# Patient Record
Sex: FEMALE | Race: WHITE | Employment: FULL TIME | URBAN - METROPOLITAN AREA
[De-identification: names, ages, dates, MRNs, and addresses within clinical notes are randomized per-mention and may not be internally consistent; named-entity substitution may affect disease eponyms.]

---

## 2017-04-04 ENCOUNTER — HOSPITAL ENCOUNTER (OUTPATIENT)
Dept: MAMMOGRAPHY | Age: 37
Discharge: HOME | End: 2017-04-04
Attending: OBSTETRICS & GYNECOLOGY
Payer: COMMERCIAL

## 2017-04-04 DIAGNOSIS — Z80.3 FAMILY HISTORY OF BREAST CANCER IN FIRST DEGREE RELATIVE: ICD-10-CM

## 2017-04-04 PROCEDURE — 77067 SCR MAMMO BI INCL CAD: CPT

## 2017-04-04 PROCEDURE — 77063 BREAST TOMOSYNTHESIS BI: CPT

## 2017-04-17 ENCOUNTER — HOSPITAL ENCOUNTER (OUTPATIENT)
Dept: ULTRASOUND IMAGING | Age: 37
Discharge: HOME | End: 2017-04-17
Attending: OBSTETRICS & GYNECOLOGY
Payer: COMMERCIAL

## 2017-04-17 DIAGNOSIS — N60.01 BILATERAL BREAST CYSTS: ICD-10-CM

## 2017-04-17 DIAGNOSIS — N60.02 BILATERAL BREAST CYSTS: ICD-10-CM

## 2017-04-17 PROCEDURE — 76641 ULTRASOUND BREAST COMPLETE: CPT

## 2017-05-31 ENCOUNTER — OFFICE VISIT (OUTPATIENT)
Dept: FAMILY MEDICINE CLINIC | Facility: CLINIC | Age: 37
End: 2017-05-31

## 2017-05-31 VITALS
HEIGHT: 63 IN | WEIGHT: 122 LBS | HEART RATE: 86 BPM | TEMPERATURE: 98 F | SYSTOLIC BLOOD PRESSURE: 108 MMHG | RESPIRATION RATE: 16 BRPM | BODY MASS INDEX: 21.62 KG/M2 | DIASTOLIC BLOOD PRESSURE: 72 MMHG

## 2017-05-31 DIAGNOSIS — Z00.00 ROUTINE MEDICAL EXAM: Primary | ICD-10-CM

## 2017-05-31 PROCEDURE — 99395 PREV VISIT EST AGE 18-39: CPT | Performed by: PHYSICIAN ASSISTANT

## 2017-05-31 NOTE — PROGRESS NOTES
HPI:   Geetha Harley is a 39year old female who presents for a work physical exam. Symptoms: denies discharge, itching, burning or dysuria, periods are regular. LMP 3 weeks ago. Getting labs done through her work in July.  She does not have a form to throat  LUNGS: denies shortness of breath with exertion  CARDIOVASCULAR: denies chest pain on exertion  GI: denies abdominal pain,denies heartburn  : denies dysuria, vaginal discharge or itching, periods regular   MUSCULOSKELETAL: denies back pain  NEURO

## 2017-07-14 ENCOUNTER — TELEPHONE (OUTPATIENT)
Dept: FAMILY MEDICINE CLINIC | Facility: CLINIC | Age: 37
End: 2017-07-14

## 2017-07-28 ENCOUNTER — PATIENT MESSAGE (OUTPATIENT)
Dept: FAMILY MEDICINE CLINIC | Facility: CLINIC | Age: 37
End: 2017-07-28

## 2017-07-28 DIAGNOSIS — R79.0 ABNORMAL BLOOD LEVEL OF IRON: Primary | ICD-10-CM

## 2017-07-28 NOTE — TELEPHONE ENCOUNTER
----- Message from Julianne Chain sent at 7/28/2017 12:11 PM CDT -----  Regarding: Test Results Question  Contact: [phone]  I recently had blood work done through the wellness program provided by my employer.   My iron level was high, and I am won

## 2017-08-01 ENCOUNTER — APPOINTMENT (OUTPATIENT)
Dept: LAB | Age: 37
End: 2017-08-01
Attending: PHYSICIAN ASSISTANT
Payer: COMMERCIAL

## 2017-08-01 DIAGNOSIS — R79.0 ABNORMAL BLOOD LEVEL OF IRON: ICD-10-CM

## 2017-08-01 LAB
DEPRECATED HBV CORE AB SER IA-ACNC: 20.3 NG/ML (ref 10–291)
IRON SATURATION: 16 % (ref 13–45)
IRON: 55 UG/DL (ref 28–170)
TOTAL IRON BINDING CAPACITY: 344 UG/DL (ref 298–536)
TRANSFERRIN: 231 MG/DL (ref 200–360)

## 2017-08-01 PROCEDURE — 83550 IRON BINDING TEST: CPT

## 2017-08-01 PROCEDURE — 82728 ASSAY OF FERRITIN: CPT

## 2017-08-01 PROCEDURE — 83540 ASSAY OF IRON: CPT

## 2017-08-01 PROCEDURE — 36415 COLL VENOUS BLD VENIPUNCTURE: CPT

## 2017-09-21 ENCOUNTER — OFFICE VISIT (OUTPATIENT)
Dept: FAMILY MEDICINE CLINIC | Facility: CLINIC | Age: 37
End: 2017-09-21

## 2017-09-21 VITALS
TEMPERATURE: 98 F | HEART RATE: 77 BPM | RESPIRATION RATE: 16 BRPM | WEIGHT: 120 LBS | HEIGHT: 63 IN | BODY MASS INDEX: 21.26 KG/M2 | OXYGEN SATURATION: 98 %

## 2017-09-21 DIAGNOSIS — N30.00 ACUTE CYSTITIS WITHOUT HEMATURIA: Primary | ICD-10-CM

## 2017-09-21 DIAGNOSIS — R30.0 DYSURIA: ICD-10-CM

## 2017-09-21 LAB
MULTISTIX LOT#: ABNORMAL NUMERIC
PH, URINE: 7 (ref 4.5–8)
SPECIFIC GRAVITY: 1.02 (ref 1–1.03)
URINE-COLOR: YELLOW
UROBILINOGEN,SEMI-QN: 1 MG/DL (ref 0–1.9)

## 2017-09-21 PROCEDURE — 87086 URINE CULTURE/COLONY COUNT: CPT | Performed by: NURSE PRACTITIONER

## 2017-09-21 PROCEDURE — 87077 CULTURE AEROBIC IDENTIFY: CPT | Performed by: NURSE PRACTITIONER

## 2017-09-21 PROCEDURE — 81003 URINALYSIS AUTO W/O SCOPE: CPT | Performed by: NURSE PRACTITIONER

## 2017-09-21 PROCEDURE — 99213 OFFICE O/P EST LOW 20 MIN: CPT | Performed by: NURSE PRACTITIONER

## 2017-09-21 RX ORDER — NITROFURANTOIN 25; 75 MG/1; MG/1
100 CAPSULE ORAL 2 TIMES DAILY
Qty: 10 CAPSULE | Refills: 0 | Status: SHIPPED | OUTPATIENT
Start: 2017-09-21 | End: 2017-09-26

## 2017-09-21 NOTE — PROGRESS NOTES
Vicente Kimball is a 40year old female. HPI:   Patient presents with symptoms of UTI for 3 days. Complaining of urinary frequency, urgency. Denies back pain, fever, hematuria.   Pt has history of UTI in past.  Pt denies any new sexual partner in th LEUKOCYTES trace Negative   APPEARANCE cloudy Clear   URINE-COLOR yellow Yellow   Multistix Lot# [id number] Numeric   Multistix Expiration Date 4/30/18 Date       ASSESSMENT AND PLAN:   UTI    Educated on medication Macrobid 100mg po bid x 5 days  Educated on · Unable to hold urine in (urinary incontinence)  · Fever  · Loss of appetite  · Confusion (in older adults)  Causes  Bladder infections are not contagious. You can't get one from someone else, from a toilet seat, or from sharing a bath.   The most common c · Drink plenty of fluids to prevent dehydration and flush out your bladder. Do this unless you must restrict fluids for other health reasons, or your doctor told you not to. · Proper cleaning after going to the bathroom is important.  Wipe from front to ba © 8361-6722 The [address] [place] [address], [address] [place]. All rights reserved. This information is not intended as a substitute for professional medical care. Always follow your healthcare professional's instructions.           The p

## 2017-10-25 ENCOUNTER — OFFICE VISIT (OUTPATIENT)
Dept: FAMILY MEDICINE CLINIC | Facility: CLINIC | Age: 37
End: 2017-10-25

## 2017-10-25 VITALS
BODY MASS INDEX: 22.5 KG/M2 | RESPIRATION RATE: 16 BRPM | HEART RATE: 72 BPM | DIASTOLIC BLOOD PRESSURE: 78 MMHG | SYSTOLIC BLOOD PRESSURE: 106 MMHG | TEMPERATURE: 99 F | HEIGHT: 63 IN | WEIGHT: 127 LBS

## 2017-10-25 DIAGNOSIS — R09.82 POST-NASAL DRIP: Primary | ICD-10-CM

## 2017-10-25 DIAGNOSIS — J30.89 CHRONIC NONSEASONAL ALLERGIC RHINITIS DUE TO OTHER ALLERGEN: ICD-10-CM

## 2017-10-25 PROCEDURE — 99213 OFFICE O/P EST LOW 20 MIN: CPT | Performed by: PHYSICIAN ASSISTANT

## 2017-10-25 RX ORDER — FLUTICASONE PROPIONATE 50 MCG
1 SPRAY, SUSPENSION (ML) NASAL DAILY
Qty: 1 BOTTLE | Refills: 1 | Status: SHIPPED | OUTPATIENT
Start: 2017-10-25 | End: 2017-11-24

## 2017-10-25 NOTE — PROGRESS NOTES
HPI:   Gualberto Ring is a 40year old female who presents for post nasal drip for several months. Patient reports post nasal drip constantly. Clears her throat often. States when she swallows, she feels a lump in her throat. Denies nasal congestion.  D on proper use. May start claritin or allegra over the counter  Advised to continue both for at least 2 weeks.   If symptoms persist, consider ENT consult for further evaluation of the globus sensation.  - Fluticasone Propionate 50 MCG/ACT Nasal Suspension;

## 2017-12-05 ENCOUNTER — PATIENT MESSAGE (OUTPATIENT)
Dept: FAMILY MEDICINE CLINIC | Facility: CLINIC | Age: 37
End: 2017-12-05

## 2017-12-05 DIAGNOSIS — R20.0 HAND NUMBNESS: Primary | ICD-10-CM

## 2017-12-05 NOTE — TELEPHONE ENCOUNTER
From: Cali Alexandra  To: Shen Saucedo MD  Sent: 12/5/2017 1:25 PM CST  Subject: Referral Request    I had received a referral from you to see Dr. Corie Goel in 2015 in regards to pain/numbness in my hand.  He recommended I receive shots in my

## 2018-05-23 ENCOUNTER — OFFICE VISIT (OUTPATIENT)
Dept: FAMILY MEDICINE CLINIC | Facility: CLINIC | Age: 38
End: 2018-05-23

## 2018-05-23 VITALS
OXYGEN SATURATION: 97 % | SYSTOLIC BLOOD PRESSURE: 104 MMHG | TEMPERATURE: 98 F | RESPIRATION RATE: 18 BRPM | HEART RATE: 78 BPM | DIASTOLIC BLOOD PRESSURE: 60 MMHG

## 2018-05-23 DIAGNOSIS — R39.9 UTI SYMPTOMS: Primary | ICD-10-CM

## 2018-05-23 PROCEDURE — 87077 CULTURE AEROBIC IDENTIFY: CPT | Performed by: NURSE PRACTITIONER

## 2018-05-23 PROCEDURE — 99213 OFFICE O/P EST LOW 20 MIN: CPT | Performed by: NURSE PRACTITIONER

## 2018-05-23 PROCEDURE — 87086 URINE CULTURE/COLONY COUNT: CPT | Performed by: NURSE PRACTITIONER

## 2018-05-23 PROCEDURE — 81003 URINALYSIS AUTO W/O SCOPE: CPT | Performed by: NURSE PRACTITIONER

## 2018-05-23 RX ORDER — NITROFURANTOIN 25; 75 MG/1; MG/1
100 CAPSULE ORAL 2 TIMES DAILY
Qty: 14 CAPSULE | Refills: 0 | Status: SHIPPED | OUTPATIENT
Start: 2018-05-23 | End: 2018-05-30

## 2018-05-23 NOTE — PROGRESS NOTES
CHIEF COMPLAINT:   Patient presents with:  UTI: x3 days      HPI:   Cony Mir is a 40year old female who presents with symptoms of UTI. Complaining of urinary frequency, urgency for past 3 days.  Symptoms have been waxing and weaning since onset BILIRUBIN neg Negative   KETONES (URINE DIPSTICK) neg Negative mg/dL   SPECIFIC GRAVITY >1.030 1.005 - 1.030   OCCULT BLOOD trace Negative   PH, URINE 6 4.5 - 8.0   PROTEIN (URINE DIPSTICK) neg Negative/Trace mg/dL   UROBILINOGEN,SEMI-QN 0.2 0.0 - 1.9 mg/d The phrases \"bladder infection,\" \"UTI,\" and \"cystitis\" are often used to describe the same thing. But they are not always the same. Cystitis is an inflammation of the bladder. The most common cause of cystitis is an infection.   Symptoms  The infectio · Take antibiotics until they are used up, even if you feel better. It is important to finish them to make sure the infection has cleared. · You can use acetaminophen or ibuprofen for pain, fever, or discomfort, unless another medicine was prescribed.  If If X-rays were done, you will be told if the results will affect your treatment.   Call 911  Call 911 if any of the following occur:  · Trouble breathing  · Hard to wake up or confusion  · Fainting or loss of consciousness  · Rapid heart rate  When to seek

## 2018-06-20 ENCOUNTER — LAB ENCOUNTER (OUTPATIENT)
Dept: LAB | Age: 38
End: 2018-06-20
Attending: PHYSICIAN ASSISTANT
Payer: COMMERCIAL

## 2018-06-20 ENCOUNTER — OFFICE VISIT (OUTPATIENT)
Dept: FAMILY MEDICINE CLINIC | Facility: CLINIC | Age: 38
End: 2018-06-20

## 2018-06-20 VITALS
BODY MASS INDEX: 21.09 KG/M2 | RESPIRATION RATE: 18 BRPM | DIASTOLIC BLOOD PRESSURE: 64 MMHG | HEART RATE: 70 BPM | SYSTOLIC BLOOD PRESSURE: 108 MMHG | OXYGEN SATURATION: 98 % | WEIGHT: 119 LBS | TEMPERATURE: 98 F | HEIGHT: 63 IN

## 2018-06-20 DIAGNOSIS — L65.9 HAIR LOSS: ICD-10-CM

## 2018-06-20 DIAGNOSIS — R53.83 OTHER FATIGUE: ICD-10-CM

## 2018-06-20 DIAGNOSIS — L65.9 HAIR LOSS: Primary | ICD-10-CM

## 2018-06-20 PROCEDURE — 83550 IRON BINDING TEST: CPT

## 2018-06-20 PROCEDURE — 82306 VITAMIN D 25 HYDROXY: CPT

## 2018-06-20 PROCEDURE — 84402 ASSAY OF FREE TESTOSTERONE: CPT

## 2018-06-20 PROCEDURE — 36415 COLL VENOUS BLD VENIPUNCTURE: CPT

## 2018-06-20 PROCEDURE — 83540 ASSAY OF IRON: CPT

## 2018-06-20 PROCEDURE — 84443 ASSAY THYROID STIM HORMONE: CPT

## 2018-06-20 PROCEDURE — 84439 ASSAY OF FREE THYROXINE: CPT

## 2018-06-20 PROCEDURE — 99213 OFFICE O/P EST LOW 20 MIN: CPT | Performed by: PHYSICIAN ASSISTANT

## 2018-06-20 PROCEDURE — 82607 VITAMIN B-12: CPT

## 2018-06-20 PROCEDURE — 84403 ASSAY OF TOTAL TESTOSTERONE: CPT

## 2018-06-20 PROCEDURE — 82627 DEHYDROEPIANDROSTERONE: CPT

## 2018-06-20 PROCEDURE — 85025 COMPLETE CBC W/AUTO DIFF WBC: CPT

## 2018-06-20 PROCEDURE — 82728 ASSAY OF FERRITIN: CPT

## 2018-06-20 NOTE — PROGRESS NOTES
HPI:    Patient ID: Mckay Guardado is a 40year old female. HPI  Pt presents to clinic with hair loss. Noticing more hair in the drain after she showers and when she garcia her hair after she showers.  States she had similar symptoms a few years ago w moist.   No areas of baldness or obvious hair loss   Eyes: Conjunctivae are normal.   Neck: Neck supple. No thyromegaly present. Cardiovascular: Normal rate, regular rhythm, normal heart sounds and intact distal pulses.     Pulmonary/Chest: Effort normal

## 2018-10-12 PROBLEM — Z98.890 HISTORY OF HYSTEROSALPINGOGRAM: Status: ACTIVE | Noted: 2018-10-12

## 2018-10-16 PROBLEM — N80.9 ENDOMETRIOSIS: Status: ACTIVE | Noted: 2018-10-16

## 2018-10-25 ENCOUNTER — HOSPITAL ENCOUNTER (OUTPATIENT)
Dept: ULTRASOUND IMAGING | Age: 38
Discharge: HOME | End: 2018-10-25
Attending: OBSTETRICS & GYNECOLOGY
Payer: COMMERCIAL

## 2018-10-25 ENCOUNTER — HOSPITAL ENCOUNTER (OUTPATIENT)
Dept: MAMMOGRAPHY | Age: 38
Discharge: HOME | End: 2018-10-25
Attending: OBSTETRICS & GYNECOLOGY
Payer: COMMERCIAL

## 2018-10-25 DIAGNOSIS — N60.02 BILATERAL BREAST CYSTS: ICD-10-CM

## 2018-10-25 DIAGNOSIS — N63.20 BILATERAL BREAST LUMP: ICD-10-CM

## 2018-10-25 DIAGNOSIS — N63.10 BILATERAL BREAST LUMP: ICD-10-CM

## 2018-10-25 DIAGNOSIS — N60.01 BILATERAL BREAST CYSTS: ICD-10-CM

## 2018-10-25 PROCEDURE — 76641 ULTRASOUND BREAST COMPLETE: CPT | Performed by: OBSTETRICS & GYNECOLOGY

## 2018-10-25 PROCEDURE — 77062 BREAST TOMOSYNTHESIS BI: CPT | Performed by: OBSTETRICS & GYNECOLOGY

## 2018-10-25 PROCEDURE — 77066 DX MAMMO INCL CAD BI: CPT | Performed by: OBSTETRICS & GYNECOLOGY

## 2018-11-02 ENCOUNTER — HOSPITAL ENCOUNTER (OUTPATIENT)
Dept: MRI IMAGING | Age: 38
Discharge: HOME | End: 2018-11-02
Attending: OBSTETRICS & GYNECOLOGY
Payer: COMMERCIAL

## 2018-11-02 DIAGNOSIS — N63.0 LUMP OR MASS IN BREAST: ICD-10-CM

## 2018-11-02 PROCEDURE — A9575 INJ GADOTERATE MEGLUMI 0.1ML: HCPCS | Performed by: OBSTETRICS & GYNECOLOGY

## 2018-11-02 PROCEDURE — 0159T MRI BREAST (W+WO) W/CAD BILAT (CPT=77059/0159T): CPT | Performed by: OBSTETRICS & GYNECOLOGY

## 2018-11-02 PROCEDURE — 77059 MRI BREAST (W+WO) W/CAD BILAT (CPT=77059/0159T): CPT | Performed by: OBSTETRICS & GYNECOLOGY

## 2019-04-05 ENCOUNTER — HOSPITAL ENCOUNTER (OUTPATIENT)
Dept: ULTRASOUND IMAGING | Age: 39
Discharge: HOME | End: 2019-04-05
Attending: OBSTETRICS & GYNECOLOGY
Payer: COMMERCIAL

## 2019-04-05 DIAGNOSIS — N60.01 BILATERAL BREAST CYSTS: ICD-10-CM

## 2019-04-05 DIAGNOSIS — N63.20 BILATERAL BREAST LUMP: ICD-10-CM

## 2019-04-05 DIAGNOSIS — N63.10 BILATERAL BREAST LUMP: ICD-10-CM

## 2019-04-05 DIAGNOSIS — N60.02 BILATERAL BREAST CYSTS: ICD-10-CM

## 2019-04-05 PROCEDURE — 76641 ULTRASOUND BREAST COMPLETE: CPT | Performed by: OBSTETRICS & GYNECOLOGY

## 2019-04-23 ENCOUNTER — OFFICE VISIT (OUTPATIENT)
Dept: FAMILY MEDICINE CLINIC | Facility: CLINIC | Age: 39
End: 2019-04-23

## 2019-04-23 VITALS
TEMPERATURE: 98 F | RESPIRATION RATE: 16 BRPM | BODY MASS INDEX: 21.51 KG/M2 | DIASTOLIC BLOOD PRESSURE: 72 MMHG | HEIGHT: 63 IN | HEART RATE: 88 BPM | OXYGEN SATURATION: 100 % | WEIGHT: 121.38 LBS | SYSTOLIC BLOOD PRESSURE: 118 MMHG

## 2019-04-23 DIAGNOSIS — B00.1 HERPES LABIALIS: Primary | ICD-10-CM

## 2019-04-23 PROCEDURE — 99213 OFFICE O/P EST LOW 20 MIN: CPT | Performed by: PHYSICIAN ASSISTANT

## 2019-04-23 RX ORDER — VALACYCLOVIR HYDROCHLORIDE 1 G/1
2 TABLET, FILM COATED ORAL EVERY 12 HOURS SCHEDULED
Qty: 4 TABLET | Refills: 0 | Status: SHIPPED | OUTPATIENT
Start: 2019-04-23 | End: 2019-04-24 | Stop reason: WASHOUT

## 2019-04-23 NOTE — PATIENT INSTRUCTIONS
-May continue abreva        Understanding Cold Sores  Cold sores are small blisters or sores on the lip or sometimes inside the mouth. Many people get them from time to time. Cold sores usually are not serious, and they usually heal in a week or two.  They visible. · Flu-like symptoms, including swollen glands, headache, body ache, or fever. These typically occur only at the time of the first infection. Cold sores may also occur on fingers. They may rarely infect the eyes, a serious possible complication. your hands thoroughly, for at least 20 seconds. When you can’t wash with soap and water, use an alcohol-based hand .   · Disinfect things you touch often, such as phones and keyboards.    · If you feel a cold sore coming on, do the same things you

## 2019-04-23 NOTE — PROGRESS NOTES
CHIEF COMPLAINT:   Patient presents with:  Mouth Cold Sores (respiratory/integumentary): x this am. no recent colds/fever      HPI:   Jaleesa Dolan is a 45year old female who presents for cold sore since this morning.   Patient reports she frequently ge NEURO: Denies headaches    EXAM:   /72 (BP Location: Left arm, Patient Position: Sitting, Cuff Size: adult)   Pulse 88   Temp 98 °F (36.7 °C) (Oral)   Resp 16   Ht 63\"   Wt 121 lb 6 oz   LMP 04/16/2019 (Approximate)   SpO2 100%   BMI 21.50 kg/m²   G Risks, benefits, and side effects of medication explained and discussed. Patient Instructions     -May continue abreva        Understanding Cold Sores  Cold sores are small blisters or sores on the lip or sometimes inside the mouth.  Many people get them · Pain or itching in the area of the outbreak. Often the pain or itching develops 12 to 24 hours before the sore become visible. · Flu-like symptoms, including swollen glands, headache, body ache, or fever.  These typically occur only at the time of the fi · Wash your hands after touching the area of a cold sore. The herpes virus can be carried from your face to your hands when you touch the area of a cold sore. When this happens, wash your hands thoroughly, for at least 20 seconds.  When you can’t wash with

## 2019-12-30 ENCOUNTER — OFFICE VISIT (OUTPATIENT)
Dept: FAMILY MEDICINE CLINIC | Facility: CLINIC | Age: 39
End: 2019-12-30

## 2019-12-30 VITALS
DIASTOLIC BLOOD PRESSURE: 60 MMHG | SYSTOLIC BLOOD PRESSURE: 110 MMHG | BODY MASS INDEX: 21.44 KG/M2 | TEMPERATURE: 98 F | HEART RATE: 78 BPM | OXYGEN SATURATION: 98 % | HEIGHT: 63 IN | RESPIRATION RATE: 18 BRPM | WEIGHT: 121 LBS

## 2019-12-30 DIAGNOSIS — J01.00 ACUTE NON-RECURRENT MAXILLARY SINUSITIS: Primary | ICD-10-CM

## 2019-12-30 PROCEDURE — 99213 OFFICE O/P EST LOW 20 MIN: CPT | Performed by: PHYSICIAN ASSISTANT

## 2019-12-30 RX ORDER — DOXYCYCLINE HYCLATE 100 MG/1
100 CAPSULE ORAL 2 TIMES DAILY
Qty: 20 CAPSULE | Refills: 0 | Status: SHIPPED | OUTPATIENT
Start: 2019-12-30 | End: 2020-01-09

## 2019-12-30 NOTE — PROGRESS NOTES
CHIEF COMPLAINT:   Patient presents with:  Sinus Problem: sinus pressure, congestion x 5 days, cough to clear PND, mild HA yesterday,       HPI:   Dwayne Vasquez is a 44year old female who presents for sinus congestion for 5 days.  Symptoms have been wor Alcohol/week: 0.0 standard drinks    Drug use: No        REVIEW OF SYSTEMS:   GENERAL: feels well otherwise,  normal appetite  HEENT: See HPI.   No ear pain   LUNGS: denies shortness of breath or wheezing, See HPI  CARDIOVASCULAR: denies chest pain or palp Prescriptions Disp Refills   • Doxycycline Hyclate 100 MG Oral Cap 20 capsule 0     Sig: Take 1 capsule (100 mg total) by mouth 2 (two) times daily for 10 days.            Patient Instructions   Please follow up with your PCP if no improvement within 5-7 da completion, please call your PCP immediately and stop your antibiotic. This may be an allergic reaction. · If you were prescribed doxycycline, it can make your skin photosensitive and susceptible to sunburn.   Please wear sunscreen and apply often; also

## 2019-12-30 NOTE — PATIENT INSTRUCTIONS
Please follow up with your PCP if no improvement within 5-7 days. Go directly to the ER for any acute worsening of symptoms. Home Care    · Take Doxy as prescribed.     · Flonase or Nasacort OTC for nasal symptoms as instructed  · Follow up with your heal susceptible to sunburn. Please wear sunscreen and apply often; also wear a hat.    · Female only: If taking birth control - Use back up birth control for pregnancy prevention for up to 1 month due to antibiotic use    Probiotics or yogurt daily during anti

## 2020-07-02 ENCOUNTER — OFFICE VISIT (OUTPATIENT)
Dept: FAMILY MEDICINE CLINIC | Facility: CLINIC | Age: 40
End: 2020-07-02

## 2020-07-02 VITALS
OXYGEN SATURATION: 99 % | HEIGHT: 63 IN | BODY MASS INDEX: 21.62 KG/M2 | SYSTOLIC BLOOD PRESSURE: 103 MMHG | HEART RATE: 82 BPM | DIASTOLIC BLOOD PRESSURE: 68 MMHG | WEIGHT: 122 LBS | TEMPERATURE: 98 F

## 2020-07-02 DIAGNOSIS — B00.1 HERPES LABIALIS: Primary | ICD-10-CM

## 2020-07-02 PROCEDURE — 99213 OFFICE O/P EST LOW 20 MIN: CPT | Performed by: PHYSICIAN ASSISTANT

## 2020-07-02 RX ORDER — VALACYCLOVIR HYDROCHLORIDE 1 G/1
TABLET, FILM COATED ORAL
Qty: 4 TABLET | Refills: 1 | Status: SHIPPED | OUTPATIENT
Start: 2020-07-02 | End: 2020-09-23

## 2020-07-02 NOTE — PROGRESS NOTES
CHIEF COMPLAINT:     Patient presents with:  Mouth Cold Sores      HPI:   Bruce Brasher is a 44year old female who presents with complaints of cold sores for the past 2 days. The patient reports a history of colds sores.   She reports the lesions are p Conjunctiva normal.  Cornea clear. Lid margins normal.  No active drainage.   EARS: Right TM normal, no bulging, no retraction, no fluid, bony landmarks normal.  Left TM normal, no bulging, no retraction, no fluid, bony landmarks normal.    NOSE: nostrils

## 2020-09-23 ENCOUNTER — OFFICE VISIT (OUTPATIENT)
Dept: FAMILY MEDICINE CLINIC | Facility: CLINIC | Age: 40
End: 2020-09-23

## 2020-09-23 VITALS
WEIGHT: 113 LBS | BODY MASS INDEX: 20.02 KG/M2 | SYSTOLIC BLOOD PRESSURE: 108 MMHG | TEMPERATURE: 100 F | OXYGEN SATURATION: 98 % | HEART RATE: 86 BPM | HEIGHT: 63 IN | DIASTOLIC BLOOD PRESSURE: 66 MMHG | RESPIRATION RATE: 16 BRPM

## 2020-09-23 DIAGNOSIS — Z00.00 ROUTINE GENERAL MEDICAL EXAMINATION AT A HEALTH CARE FACILITY: Primary | ICD-10-CM

## 2020-09-23 DIAGNOSIS — R00.2 PALPITATIONS: ICD-10-CM

## 2020-09-23 DIAGNOSIS — B00.1 RECURRENT COLD SORES: ICD-10-CM

## 2020-09-23 DIAGNOSIS — I49.8 BIGEMINY: ICD-10-CM

## 2020-09-23 PROCEDURE — 3008F BODY MASS INDEX DOCD: CPT | Performed by: FAMILY MEDICINE

## 2020-09-23 PROCEDURE — 3078F DIAST BP <80 MM HG: CPT | Performed by: FAMILY MEDICINE

## 2020-09-23 PROCEDURE — 3074F SYST BP LT 130 MM HG: CPT | Performed by: FAMILY MEDICINE

## 2020-09-23 PROCEDURE — 93000 ELECTROCARDIOGRAM COMPLETE: CPT | Performed by: FAMILY MEDICINE

## 2020-09-23 PROCEDURE — 99396 PREV VISIT EST AGE 40-64: CPT | Performed by: FAMILY MEDICINE

## 2020-09-23 RX ORDER — VALACYCLOVIR HYDROCHLORIDE 1 G/1
1 TABLET, FILM COATED ORAL 2 TIMES DAILY
Qty: 20 TABLET | Refills: 1 | Status: SHIPPED | OUTPATIENT
Start: 2020-09-23 | End: 2021-10-06

## 2020-09-23 NOTE — PATIENT INSTRUCTIONS
TSH for thyroid due to heart palpitations    Should also check blood count and electrolytes, but this is pretty standard on work blood draws.

## 2020-09-23 NOTE — PROGRESS NOTES
HPI:  Here for a physical.    PAST MEDICAL HISTORY:  Past Medical History:   Diagnosis Date   • Cervical herniated disc 7/58/6703   • Umbilical granuloma 5/4/8158     PAST SURGICAL HISTORY:  Past Surgical History:   Procedure Laterality Date   • COLPOSCOPY use: No        Alcohol/week: 0.0 standard drinks      Drug use: No      Sexual activity: Not on file    Lifestyle      Physical activity        Days per week: Not on file        Minutes per session: Not on file      Stress: Not on file    Relationships history of hyperthyroidism. PULMONARY: No complaints of extreme shortness of breath with activity. No complaints of  wheezing. No complaints of hemoptysis. GASTROINTESTINAL:No complaints of dysphgia or any GERD symptoms. Denies hematochezia and melena.  Jackie Boles bilaterally, good air exchange, no rhonchi, wheezes, or rales. No dullness to percussion. ABDOMEN: Soft, nontender, nondistended, NABS x 4 quadrants. No HSM; no masses; no bruits.   LYMPHATIC: no lymphadenopathy palpated about the anterior and posterior ce

## 2020-10-01 ENCOUNTER — HOSPITAL ENCOUNTER (OUTPATIENT)
Dept: CV DIAGNOSTICS | Age: 40
Discharge: HOME | End: 2020-10-01
Attending: FAMILY MEDICINE
Payer: COMMERCIAL

## 2020-10-01 DIAGNOSIS — R00.2 PALPITATIONS: ICD-10-CM

## 2020-10-01 PROCEDURE — 93271 ECG/MONITORING AND ANALYSIS: CPT | Performed by: FAMILY MEDICINE

## 2020-10-01 PROCEDURE — 93270 REMOTE 30 DAY ECG REV/REPORT: CPT | Performed by: FAMILY MEDICINE

## 2020-10-01 PROCEDURE — 93272 ECG/REVIEW INTERPRET ONLY: CPT | Performed by: FAMILY MEDICINE

## 2020-11-18 PROCEDURE — 88175 CYTOPATH C/V AUTO FLUID REDO: CPT | Performed by: OBSTETRICS & GYNECOLOGY

## 2020-11-18 PROCEDURE — 87624 HPV HI-RISK TYP POOLED RSLT: CPT | Performed by: OBSTETRICS & GYNECOLOGY

## 2020-11-20 ENCOUNTER — HOSPITAL ENCOUNTER (OUTPATIENT)
Dept: ULTRASOUND IMAGING | Age: 40
Discharge: HOME | End: 2020-11-20
Attending: OBSTETRICS & GYNECOLOGY
Payer: COMMERCIAL

## 2020-11-20 ENCOUNTER — HOSPITAL ENCOUNTER (OUTPATIENT)
Dept: MAMMOGRAPHY | Age: 40
Discharge: HOME | End: 2020-11-20
Attending: OBSTETRICS & GYNECOLOGY
Payer: COMMERCIAL

## 2020-11-20 ENCOUNTER — TELEPHONE (OUTPATIENT)
Dept: MAMMOGRAPHY | Facility: HOSPITAL | Age: 40
End: 2020-11-20

## 2020-11-20 DIAGNOSIS — N60.22 FIBROADENOSIS OF LEFT BREAST: ICD-10-CM

## 2020-11-20 DIAGNOSIS — N60.21 FIBROADENOSIS OF RIGHT BREAST: ICD-10-CM

## 2020-11-20 DIAGNOSIS — Z80.3 FAMILY HISTORY OF MALIGNANT NEOPLASM OF BREAST: ICD-10-CM

## 2020-11-20 PROCEDURE — 77062 BREAST TOMOSYNTHESIS BI: CPT | Performed by: OBSTETRICS & GYNECOLOGY

## 2020-11-20 PROCEDURE — 76641 ULTRASOUND BREAST COMPLETE: CPT | Performed by: OBSTETRICS & GYNECOLOGY

## 2020-11-20 PROCEDURE — 77066 DX MAMMO INCL CAD BI: CPT | Performed by: OBSTETRICS & GYNECOLOGY

## 2020-11-20 NOTE — TELEPHONE ENCOUNTER
This [facility] RN phoned pt re left breast 1 site and right breast 1 site ultrasound guided  biopsy recommendation by Dr. Cynthia Amaya for masses. Procedure reviewed and all questions answered. Emotional support given.  Confirmed pt did receive educational parsons

## 2020-11-30 ENCOUNTER — HOSPITAL ENCOUNTER (OUTPATIENT)
Dept: MAMMOGRAPHY | Facility: HOSPITAL | Age: 40
Discharge: HOME | End: 2020-11-30
Attending: OBSTETRICS & GYNECOLOGY
Payer: COMMERCIAL

## 2020-11-30 DIAGNOSIS — R92.8 ABNORMAL MAMMOGRAM: ICD-10-CM

## 2020-11-30 PROCEDURE — 19084 BX BREAST ADD LESION US IMAG: CPT | Performed by: OBSTETRICS & GYNECOLOGY

## 2020-11-30 PROCEDURE — 19083 BX BREAST 1ST LESION US IMAG: CPT | Performed by: OBSTETRICS & GYNECOLOGY

## 2020-11-30 PROCEDURE — 77066 DX MAMMO INCL CAD BI: CPT | Performed by: OBSTETRICS & GYNECOLOGY

## 2020-11-30 PROCEDURE — 88305 TISSUE EXAM BY PATHOLOGIST: CPT | Performed by: OBSTETRICS & GYNECOLOGY

## 2020-12-01 NOTE — IMAGING NOTE
Contacted Ms. Clark post Ultrasound Guided Right Breast and Ultrasound Guided Left Breast biopsies. Reinforced post biopsy care and instruction. Ms. Dorian Johnson denies any issues with biopsy site- bleeding, drainage, redness, tenderness.      Pathology re

## 2020-12-02 ENCOUNTER — TELEPHONE (OUTPATIENT)
Dept: GENERAL RADIOLOGY | Facility: HOSPITAL | Age: 40
End: 2020-12-02

## 2020-12-02 NOTE — TELEPHONE ENCOUNTER
0815:  Received a telephone call from Ms. Clark at this time. Ms. Shae Britton reported concerns regarding skin irritation from site under steri strips on the left breast.  Ms. Shae Tobi described a reddened and blistered area.   Ms. Shae Britton reported that s

## 2021-01-06 DIAGNOSIS — B00.1 RECURRENT COLD SORES: ICD-10-CM

## 2021-01-06 RX ORDER — VALACYCLOVIR HYDROCHLORIDE 1 G/1
1 TABLET, FILM COATED ORAL 2 TIMES DAILY
Qty: 20 TABLET | Refills: 1 | Status: CANCELLED | OUTPATIENT
Start: 2021-01-06 | End: 2021-01-16

## 2021-05-10 ENCOUNTER — OFFICE VISIT (OUTPATIENT)
Dept: FAMILY MEDICINE CLINIC | Facility: CLINIC | Age: 41
End: 2021-05-10

## 2021-05-10 VITALS
OXYGEN SATURATION: 98 % | HEIGHT: 63 IN | DIASTOLIC BLOOD PRESSURE: 85 MMHG | BODY MASS INDEX: 23.5 KG/M2 | HEART RATE: 92 BPM | WEIGHT: 132.63 LBS | SYSTOLIC BLOOD PRESSURE: 140 MMHG | TEMPERATURE: 98 F

## 2021-05-10 DIAGNOSIS — H66.001 ACUTE SUPPURATIVE OTITIS MEDIA OF RIGHT EAR WITHOUT SPONTANEOUS RUPTURE OF TYMPANIC MEMBRANE, RECURRENCE NOT SPECIFIED: Primary | ICD-10-CM

## 2021-05-10 PROCEDURE — 3077F SYST BP >= 140 MM HG: CPT | Performed by: PHYSICIAN ASSISTANT

## 2021-05-10 PROCEDURE — 3008F BODY MASS INDEX DOCD: CPT | Performed by: PHYSICIAN ASSISTANT

## 2021-05-10 PROCEDURE — 99213 OFFICE O/P EST LOW 20 MIN: CPT | Performed by: PHYSICIAN ASSISTANT

## 2021-05-10 PROCEDURE — 3079F DIAST BP 80-89 MM HG: CPT | Performed by: PHYSICIAN ASSISTANT

## 2021-05-10 RX ORDER — DOXYCYCLINE HYCLATE 100 MG/1
100 CAPSULE ORAL 2 TIMES DAILY
Qty: 20 CAPSULE | Refills: 0 | Status: SHIPPED | OUTPATIENT
Start: 2021-05-10 | End: 2021-05-20

## 2021-05-10 RX ORDER — FLUTICASONE PROPIONATE 50 MCG
2 SPRAY, SUSPENSION (ML) NASAL DAILY
Qty: 1 BOTTLE | Refills: 0 | Status: SHIPPED | OUTPATIENT
Start: 2021-05-10 | End: 2021-05-24

## 2021-05-10 NOTE — PROGRESS NOTES
CHIEF COMPLAINT:   No chief complaint on file. HPI:   Su Lopes is a 36year old female who presents to clinic today with complaints of right ear pain that began today. Associated symptoms:  Patient reports decreased hearing.  Patient den wheezing. CARDIOVASCULAR: No chest pain, palpitations  GI: No N/V/C/D.   NEURO: denies headaches or dizziness    EXAM:   /85   Pulse 92   Temp 98.2 °F (36.8 °C) (Skin)   Ht 5' 3\" (1.6 m)   Wt 132 lb 9.6 oz (60.1 kg)   LMP 04/27/2021 (Approximate) full course of antibiotic. Tylenol/Motrin prn pain. Call or return if s/sx worsen, do not improve in 3 days, or if fever of 100.4 or greater persists for 72 hours. Patient Instructions   Patient Declined AVS    Verbal Instructions given      1.

## 2021-05-10 NOTE — PATIENT INSTRUCTIONS
Patient Declined AVS    Verbal Instructions given      1. Doxycycline  2. Flonase  3. OTC pain relief  4. Follow up with PCP  5.  If worsening symptoms seek treatment

## 2021-05-24 NOTE — PROGRESS NOTES
Patient aware to repeat U/S in 6 months.
Patient is aware of need for follow up breast U/S in 6 months per radiology
show

## 2021-05-27 ENCOUNTER — HOSPITAL ENCOUNTER (OUTPATIENT)
Dept: ULTRASOUND IMAGING | Age: 41
Discharge: HOME | End: 2021-05-27
Attending: OBSTETRICS & GYNECOLOGY
Payer: COMMERCIAL

## 2021-05-27 ENCOUNTER — HOSPITAL ENCOUNTER (OUTPATIENT)
Dept: MAMMOGRAPHY | Age: 41
Discharge: HOME | End: 2021-05-27
Attending: OBSTETRICS & GYNECOLOGY
Payer: COMMERCIAL

## 2021-05-27 DIAGNOSIS — N60.12 FIBROCYSTIC BREAST CHANGES OF BOTH BREASTS: ICD-10-CM

## 2021-05-27 DIAGNOSIS — N60.11 FIBROCYSTIC BREAST CHANGES OF BOTH BREASTS: ICD-10-CM

## 2021-05-27 PROCEDURE — 76642 ULTRASOUND BREAST LIMITED: CPT | Performed by: OBSTETRICS & GYNECOLOGY

## 2021-05-27 PROCEDURE — 77062 BREAST TOMOSYNTHESIS BI: CPT | Performed by: OBSTETRICS & GYNECOLOGY

## 2021-05-27 PROCEDURE — 77066 DX MAMMO INCL CAD BI: CPT | Performed by: OBSTETRICS & GYNECOLOGY

## 2021-07-16 ENCOUNTER — OFFICE VISIT (OUTPATIENT)
Dept: FAMILY MEDICINE CLINIC | Facility: CLINIC | Age: 41
End: 2021-07-16

## 2021-07-16 VITALS
SYSTOLIC BLOOD PRESSURE: 128 MMHG | RESPIRATION RATE: 20 BRPM | HEART RATE: 84 BPM | BODY MASS INDEX: 21.97 KG/M2 | WEIGHT: 124 LBS | DIASTOLIC BLOOD PRESSURE: 86 MMHG | HEIGHT: 63 IN | OXYGEN SATURATION: 98 %

## 2021-07-16 DIAGNOSIS — M54.12 CERVICAL RADICULOPATHY AT C5: Primary | ICD-10-CM

## 2021-07-16 DIAGNOSIS — G56.02 CARPAL TUNNEL SYNDROME OF LEFT WRIST: ICD-10-CM

## 2021-07-16 PROCEDURE — 3008F BODY MASS INDEX DOCD: CPT | Performed by: FAMILY MEDICINE

## 2021-07-16 PROCEDURE — 3079F DIAST BP 80-89 MM HG: CPT | Performed by: FAMILY MEDICINE

## 2021-07-16 PROCEDURE — 99213 OFFICE O/P EST LOW 20 MIN: CPT | Performed by: FAMILY MEDICINE

## 2021-07-16 PROCEDURE — 3074F SYST BP LT 130 MM HG: CPT | Performed by: FAMILY MEDICINE

## 2021-07-16 RX ORDER — METHYLPREDNISOLONE 4 MG/1
TABLET ORAL
Qty: 1 EACH | Refills: 0 | Status: SHIPPED | OUTPATIENT
Start: 2021-07-16 | End: 2021-10-06 | Stop reason: ALTCHOICE

## 2021-07-16 NOTE — PROGRESS NOTES
History of bilateral carpal tunnel syndrome. Had an electromyelogram of the left arm back in 2015 that I was able to review in the note from neurology dated April 24, 2015.   Neurologist also had her do an MRI of her neck which does show mild to moderate d (Exact Date)   SpO2 98%   BMI 21.97 kg/m²     Alert no acute distress neck is nontender. Good range of motion. Shoulders no tenderness in the [facility] joint. Elbows no tenderness over the epicondyles. Full range of motion. Wrists good range of motion.   Kaitlin Monsalve

## 2021-10-06 ENCOUNTER — OFFICE VISIT (OUTPATIENT)
Dept: FAMILY MEDICINE CLINIC | Facility: CLINIC | Age: 41
End: 2021-10-06

## 2021-10-06 VITALS
OXYGEN SATURATION: 99 % | BODY MASS INDEX: 22.5 KG/M2 | HEIGHT: 63 IN | SYSTOLIC BLOOD PRESSURE: 108 MMHG | DIASTOLIC BLOOD PRESSURE: 64 MMHG | HEART RATE: 74 BPM | RESPIRATION RATE: 20 BRPM | WEIGHT: 127 LBS

## 2021-10-06 DIAGNOSIS — N80.9 ENDOMETRIOSIS: ICD-10-CM

## 2021-10-06 DIAGNOSIS — K59.1 FUNCTIONAL DIARRHEA: ICD-10-CM

## 2021-10-06 DIAGNOSIS — R73.01 ELEVATED FASTING GLUCOSE: ICD-10-CM

## 2021-10-06 DIAGNOSIS — R10.11 RUQ ABDOMINAL PAIN: ICD-10-CM

## 2021-10-06 DIAGNOSIS — Z00.00 ROUTINE GENERAL MEDICAL EXAMINATION AT A HEALTH CARE FACILITY: Primary | ICD-10-CM

## 2021-10-06 DIAGNOSIS — B00.1 RECURRENT COLD SORES: ICD-10-CM

## 2021-10-06 PROBLEM — Z98.890 HISTORY OF HYSTEROSALPINGOGRAM: Status: RESOLVED | Noted: 2018-10-12 | Resolved: 2021-10-06

## 2021-10-06 PROCEDURE — 3078F DIAST BP <80 MM HG: CPT | Performed by: FAMILY MEDICINE

## 2021-10-06 PROCEDURE — 3008F BODY MASS INDEX DOCD: CPT | Performed by: FAMILY MEDICINE

## 2021-10-06 PROCEDURE — 99213 OFFICE O/P EST LOW 20 MIN: CPT | Performed by: FAMILY MEDICINE

## 2021-10-06 PROCEDURE — 3074F SYST BP LT 130 MM HG: CPT | Performed by: FAMILY MEDICINE

## 2021-10-06 PROCEDURE — 99396 PREV VISIT EST AGE 40-64: CPT | Performed by: FAMILY MEDICINE

## 2021-10-06 RX ORDER — VALACYCLOVIR HYDROCHLORIDE 1 G/1
1 TABLET, FILM COATED ORAL 2 TIMES DAILY
Qty: 20 TABLET | Refills: 2 | Status: SHIPPED | OUTPATIENT
Start: 2021-10-06 | End: 2021-11-05

## 2021-10-06 NOTE — PROGRESS NOTES
HPI:  Here for a physical.    PAST MEDICAL HISTORY:  Past Medical History:   Diagnosis Date   • Cervical herniated disc 7/21/2015   • History of hysterosalpingogram 2015 Normal 10/12/2018   • Ulnar nerve entrapment 8/2/7618   • Umbilical granuloma 1/7/6963 Concerns:         Service: Not Asked        Blood Transfusions: Not Asked        Caffeine Concern: Yes          1-2 cups a day        Occupational Exposure: Not Asked        Hobby Hazards: Not Asked        Sleep Concern: Not Asked        Stress C tinnitus or decreased hearing acuity. CARDIOVASCULAR: No complaints of chest pain with exertion, no PND, orthopnea, or edema. No decrease in exercise tolerance. PULMONARY: No complaints of extreme shortness of breath with activity.  No complaints of  whee anterior and posterior cervical chains, submandibular and supraclavicular areas, axilla, and inguinal areas. EXTREMITIES / MUSCULOSKELETAL: 4 extremities with grossly normal ROM. Gait NL. No cyanosis, clubbing or edema.   NEUROLOGIC: CN's II-XII grossly in herniated disc 7/21/2015   • History of hysterosalpingogram 2015 Normal 10/12/2018   • Ulnar nerve entrapment 0/0/8870   • Umbilical granuloma 0/5/2799     PAST SURGICAL HISTORY:  Past Surgical History:   Procedure Laterality Date   • COLPOSCOPY, CERVIX W/ will bring this up with Dr. Humberto Matt. We discussed the diagnostic gold standard as a laparoscopy. 4. Elevated fasting glucose  Glucose was 114 but A1c normal at 5.4. Reassured her. I do not think her urine frequency is because of diabetes.   We will r

## 2021-11-05 ENCOUNTER — HOSPITAL ENCOUNTER (OUTPATIENT)
Dept: ULTRASOUND IMAGING | Age: 41
Discharge: HOME | End: 2021-11-05
Attending: FAMILY MEDICINE
Payer: COMMERCIAL

## 2021-11-05 DIAGNOSIS — K59.1 FUNCTIONAL DIARRHEA: ICD-10-CM

## 2021-11-05 DIAGNOSIS — R10.11 RUQ ABDOMINAL PAIN: ICD-10-CM

## 2021-11-05 PROCEDURE — 76700 US EXAM ABDOM COMPLETE: CPT | Performed by: FAMILY MEDICINE

## 2021-11-29 ENCOUNTER — OFFICE VISIT (OUTPATIENT)
Dept: FAMILY MEDICINE CLINIC | Facility: CLINIC | Age: 41
End: 2021-11-29

## 2021-11-29 VITALS
HEIGHT: 63 IN | WEIGHT: 127.5 LBS | OXYGEN SATURATION: 96 % | SYSTOLIC BLOOD PRESSURE: 104 MMHG | RESPIRATION RATE: 17 BRPM | HEART RATE: 90 BPM | DIASTOLIC BLOOD PRESSURE: 76 MMHG | BODY MASS INDEX: 22.59 KG/M2 | TEMPERATURE: 98 F

## 2021-11-29 DIAGNOSIS — L30.9 DERMATITIS OF FACE: Primary | ICD-10-CM

## 2021-11-29 PROCEDURE — 3074F SYST BP LT 130 MM HG: CPT | Performed by: FAMILY MEDICINE

## 2021-11-29 PROCEDURE — 3078F DIAST BP <80 MM HG: CPT | Performed by: FAMILY MEDICINE

## 2021-11-29 PROCEDURE — 99213 OFFICE O/P EST LOW 20 MIN: CPT | Performed by: FAMILY MEDICINE

## 2021-11-29 PROCEDURE — 3008F BODY MASS INDEX DOCD: CPT | Performed by: FAMILY MEDICINE

## 2021-11-29 NOTE — PROGRESS NOTES
2 weeks of a reddish lesion on the right cheek. Little bit of itch, no pain, no drainage, and no bleeding. Has not done anything for it. Has not had fevers or chills. Denies neck lymphadenopathy.     PAST MEDICAL HISTORY:  Past Medical History:   Shea Saul 11/27/2021   SpO2 96%   BMI 22.59 kg/m²     Reddish patch on right cheek measures approximately 6 x 6 mm. Slightly raised. No scales. Skin lines are visible throughout the lesion. No cervical lymphadenopathy. ASSESSMENT/PLAN:    1.  Dermatitis of fac

## 2021-11-29 NOTE — PATIENT INSTRUCTIONS
Use a tiny dab and rub it into the skin. Wash your hands with soap and water after application. Let me know if the lesion is still present in 2 weeks.

## 2022-01-08 ENCOUNTER — LAB ENCOUNTER (OUTPATIENT)
Dept: LAB | Facility: HOSPITAL | Age: 42
End: 2022-01-08
Attending: INTERNAL MEDICINE
Payer: COMMERCIAL

## 2022-01-08 DIAGNOSIS — Z01.818 PRE-OP TESTING: ICD-10-CM

## 2022-01-09 LAB — SARS-COV-2 RNA RESP QL NAA+PROBE: NOT DETECTED

## 2022-01-11 PROBLEM — R14.1 ABDOMINAL GAS PAIN: Status: ACTIVE | Noted: 2022-01-11

## 2022-01-11 PROBLEM — R10.11 RIGHT UPPER QUADRANT PAIN: Status: ACTIVE | Noted: 2022-01-11

## 2022-01-11 PROBLEM — R10.12 LEFT UPPER QUADRANT PAIN: Status: ACTIVE | Noted: 2022-01-11

## 2022-01-11 PROBLEM — R19.7 DIARRHEA, UNSPECIFIED: Status: ACTIVE | Noted: 2022-01-11

## 2022-01-11 PROCEDURE — 88305 TISSUE EXAM BY PATHOLOGIST: CPT | Performed by: INTERNAL MEDICINE

## 2022-06-17 ENCOUNTER — HOSPITAL ENCOUNTER (OUTPATIENT)
Dept: MAMMOGRAPHY | Age: 42
Discharge: HOME | End: 2022-06-17
Attending: OBSTETRICS & GYNECOLOGY
Payer: COMMERCIAL

## 2022-06-17 ENCOUNTER — HOSPITAL ENCOUNTER (OUTPATIENT)
Dept: ULTRASOUND IMAGING | Age: 42
Discharge: HOME | End: 2022-06-17
Attending: OBSTETRICS & GYNECOLOGY
Payer: COMMERCIAL

## 2022-06-17 DIAGNOSIS — N60.11 FIBROCYSTIC BREAST CHANGES, BILATERAL: ICD-10-CM

## 2022-06-17 DIAGNOSIS — N60.12 FIBROCYSTIC BREAST CHANGES, BILATERAL: ICD-10-CM

## 2022-06-17 PROCEDURE — 76642 ULTRASOUND BREAST LIMITED: CPT | Performed by: OBSTETRICS & GYNECOLOGY

## 2022-06-17 PROCEDURE — 77062 BREAST TOMOSYNTHESIS BI: CPT | Performed by: OBSTETRICS & GYNECOLOGY

## 2022-06-17 PROCEDURE — 77066 DX MAMMO INCL CAD BI: CPT | Performed by: OBSTETRICS & GYNECOLOGY

## 2022-08-11 ENCOUNTER — OFFICE VISIT (OUTPATIENT)
Dept: FAMILY MEDICINE CLINIC | Facility: CLINIC | Age: 42
End: 2022-08-11
Payer: COMMERCIAL

## 2022-08-11 VITALS
OXYGEN SATURATION: 97 % | HEIGHT: 63 IN | SYSTOLIC BLOOD PRESSURE: 102 MMHG | RESPIRATION RATE: 16 BRPM | WEIGHT: 119.38 LBS | HEART RATE: 87 BPM | BODY MASS INDEX: 21.15 KG/M2 | DIASTOLIC BLOOD PRESSURE: 58 MMHG

## 2022-08-11 DIAGNOSIS — R22.41 MASS OF SKIN OF RIGHT FOOT: Primary | ICD-10-CM

## 2022-08-11 PROCEDURE — 3078F DIAST BP <80 MM HG: CPT | Performed by: FAMILY MEDICINE

## 2022-08-11 PROCEDURE — 99212 OFFICE O/P EST SF 10 MIN: CPT | Performed by: FAMILY MEDICINE

## 2022-08-11 PROCEDURE — 3074F SYST BP LT 130 MM HG: CPT | Performed by: FAMILY MEDICINE

## 2022-08-11 PROCEDURE — 3008F BODY MASS INDEX DOCD: CPT | Performed by: FAMILY MEDICINE

## 2022-09-21 ENCOUNTER — HOSPITAL ENCOUNTER (OUTPATIENT)
Dept: ULTRASOUND IMAGING | Facility: HOSPITAL | Age: 42
Discharge: HOME | End: 2022-09-21
Attending: FAMILY MEDICINE

## 2022-09-21 DIAGNOSIS — R22.41 MASS OF SKIN OF RIGHT FOOT: ICD-10-CM

## 2022-09-21 PROCEDURE — 76882 US LMTD JT/FCL EVL NVASC XTR: CPT | Performed by: FAMILY MEDICINE

## 2023-07-18 PROCEDURE — 87624 HPV HI-RISK TYP POOLED RSLT: CPT | Performed by: OBSTETRICS & GYNECOLOGY

## 2023-08-16 ENCOUNTER — OFFICE VISIT (OUTPATIENT)
Dept: FAMILY MEDICINE CLINIC | Facility: CLINIC | Age: 43
End: 2023-08-16
Payer: COMMERCIAL

## 2023-08-16 VITALS
OXYGEN SATURATION: 99 % | RESPIRATION RATE: 16 BRPM | DIASTOLIC BLOOD PRESSURE: 80 MMHG | BODY MASS INDEX: 23 KG/M2 | TEMPERATURE: 97 F | SYSTOLIC BLOOD PRESSURE: 114 MMHG | HEART RATE: 80 BPM | WEIGHT: 130 LBS

## 2023-08-16 DIAGNOSIS — R82.90 ABNORMAL URINALYSIS: ICD-10-CM

## 2023-08-16 DIAGNOSIS — R39.9 UTI SYMPTOMS: Primary | ICD-10-CM

## 2023-08-16 LAB
APPEARANCE: CLEAR
BILIRUBIN: NEGATIVE
GLUCOSE (URINE DIPSTICK): NEGATIVE MG/DL
KETONES (URINE DIPSTICK): NEGATIVE MG/DL
MULTISTIX LOT#: ABNORMAL NUMERIC
NITRITE, URINE: NEGATIVE
PH, URINE: 7.5 (ref 4.5–8)
PROTEIN (URINE DIPSTICK): 30 MG/DL
SPECIFIC GRAVITY: 1.02 (ref 1–1.03)
URINE-COLOR: YELLOW
UROBILINOGEN,SEMI-QN: 0.2 MG/DL (ref 0–1.9)

## 2023-08-16 PROCEDURE — 3079F DIAST BP 80-89 MM HG: CPT

## 2023-08-16 PROCEDURE — 99213 OFFICE O/P EST LOW 20 MIN: CPT

## 2023-08-16 PROCEDURE — 3074F SYST BP LT 130 MM HG: CPT

## 2023-08-16 PROCEDURE — 87086 URINE CULTURE/COLONY COUNT: CPT

## 2023-08-16 PROCEDURE — 81003 URINALYSIS AUTO W/O SCOPE: CPT

## 2023-08-16 RX ORDER — NITROFURANTOIN 25; 75 MG/1; MG/1
100 CAPSULE ORAL 2 TIMES DAILY
Qty: 14 CAPSULE | Refills: 0 | Status: SHIPPED | OUTPATIENT
Start: 2023-08-16 | End: 2023-08-23

## 2023-09-11 ENCOUNTER — HOSPITAL ENCOUNTER (OUTPATIENT)
Dept: MAMMOGRAPHY | Age: 43
Discharge: HOME | End: 2023-09-11
Attending: OBSTETRICS & GYNECOLOGY
Payer: COMMERCIAL

## 2023-09-11 DIAGNOSIS — N60.19 FIBROCYSTIC BREAST CHANGES, UNSPECIFIED LATERALITY: ICD-10-CM

## 2023-09-11 DIAGNOSIS — R92.2 DENSE BREAST TISSUE: ICD-10-CM

## 2023-09-11 PROCEDURE — 77066 DX MAMMO INCL CAD BI: CPT | Performed by: OBSTETRICS & GYNECOLOGY

## 2023-09-11 PROCEDURE — 77062 BREAST TOMOSYNTHESIS BI: CPT | Performed by: OBSTETRICS & GYNECOLOGY

## 2024-02-27 ENCOUNTER — OFFICE VISIT (OUTPATIENT)
Dept: FAMILY MEDICINE CLINIC | Facility: CLINIC | Age: 44
End: 2024-02-27
Payer: COMMERCIAL

## 2024-02-27 VITALS
TEMPERATURE: 98 F | OXYGEN SATURATION: 98 % | DIASTOLIC BLOOD PRESSURE: 68 MMHG | WEIGHT: 130 LBS | HEART RATE: 88 BPM | RESPIRATION RATE: 18 BRPM | SYSTOLIC BLOOD PRESSURE: 108 MMHG | HEIGHT: 63 IN | BODY MASS INDEX: 23.04 KG/M2

## 2024-02-27 DIAGNOSIS — B34.9 ACUTE VIRAL SYNDROME: Primary | ICD-10-CM

## 2024-02-27 PROCEDURE — 3074F SYST BP LT 130 MM HG: CPT | Performed by: NURSE PRACTITIONER

## 2024-02-27 PROCEDURE — 3008F BODY MASS INDEX DOCD: CPT | Performed by: NURSE PRACTITIONER

## 2024-02-27 PROCEDURE — 3078F DIAST BP <80 MM HG: CPT | Performed by: NURSE PRACTITIONER

## 2024-02-27 PROCEDURE — 99213 OFFICE O/P EST LOW 20 MIN: CPT | Performed by: NURSE PRACTITIONER

## 2024-02-27 NOTE — PATIENT INSTRUCTIONS
Push fluids and rest  Expectorant such as mucinex  Follow up for any new or worsening symptoms or if symptoms are not improving over the next 3-4 days.

## 2024-02-27 NOTE — PROGRESS NOTES
CHIEF COMPLAINT:     Chief Complaint   Patient presents with    Sore Throat     Congestion, fatigue       HPI:   Cande Tamez is a 43 year old female who presents for sore throat, sinus congestion, cough. Symptoms started 1 week ago. Had body aches at onset, temp up to 100, stayed home with fatigue and decreased appetite for the first 4 days. Now feeling better overall. However has continued cough and congestion  Treating symptoms with nyquil, advil,.  Patient has not tested for COVID since symptom onset. No other household contacts have been ill.     Current Outpatient Medications   Medication Sig Dispense Refill    Etonogestrel-Ethinyl Estradiol (NUVARING) 0.12-0.015 MG/24HR Vaginal Ring Place 1 Ring vaginally every 21 days. Use as directed 4 Ring 3    Multiple Vitamins-Minerals (WOMENS MULTIVITAMIN) Oral Tab  (Patient not taking: Reported on 7/18/2023)        Past Medical History:   Diagnosis Date    Abdominal hernia 2013    Abdominal pain Jan 2021    Sometimes severe and usually after eating    Anxiety     Atypical mole 2 weeks ago    Appears to be dermatitis    Belching Jan 2021    Bleeding nose 2013    Frequent nosebleeds in 2013. Cauterization corrected it    Bloating Jan 2021    Body piercing     Ears only    Cervical herniated disc 7/21/2015    Chest pain     Diarrhea, unspecified Jan 2021    Coincides with monthly cycle    Endometriosis 2013    Fatigue Few years ago    Constantly tired    Flatulence/gas pain/belching Jan 2021    Usually after eating    Food intolerance Jan 2021    Undiagnosed, but definitely seems to be an issue    Frequent urination Few years ago    Heart palpitations September 2020    Few episodes of sudden rapid hearbeat    Heavy menses 2015    History of depression 2014    Undiagnosed and no medication was ever taken or prescribed    History of hysterosalpingogram 2015 Normal 10/12/2018    Indigestion Jan 2021    Menses painful 2015    Mouth sores 2002    Cold sores… yes. Stress  induced    Shortness of breath 2021    Typically only with strenuous activity    Sleep disturbance 2 months ago    Stress     Ulnar nerve entrapment 2013    Umbilical granuloma 3/4/2013    Wears glasses     Weight gain 2021      Past Surgical History:   Procedure Laterality Date    COLPOSCOPY, CERVIX W/UPPER ADJACENT VAGINA; W/BIOPSY(S), CERVIX  2007    LGSIL    HERNIA SURGERY  2013    NEEDLE BIOPSY LEFT  2020    NEEDLE BIOPSY RIGHT  2020    OTHER SURGICAL HISTORY      HSG 2015    SCLEROTHERAPY Bilateral 2014    veins    UMBILICAL HERNIA REPAIR  2013    Procedure: HERNIA UMBILICAL REPAIR ADULT;  Surgeon: SAFIA Apodaca MD;  Location:  MAIN OR         Social History     Socioeconomic History    Marital status:    Tobacco Use    Smoking status: Former     Packs/day: 0.50     Years: 7.00     Additional pack years: 0.00     Total pack years: 3.50     Types: Cigarettes     Quit date: 3/27/2002     Years since quittin.9    Smokeless tobacco: Never    Tobacco comments:     Quit cold turkey   Vaping Use    Vaping Use: Never used   Substance and Sexual Activity    Alcohol use: Yes     Alcohol/week: 1.0 standard drink of alcohol     Types: 1 Standard drinks or equivalent per week     Comment: Very rarely drink    Drug use: Never    Sexual activity: Yes     Partners: Male   Other Topics Concern    Caffeine Concern Yes     Comment: 1-2 cups a day    Exercise Yes     Comment: walking         REVIEW OF SYSTEMS:   GENERAL: decreased appetite  SKIN: no rashes or abnormal skin lesions  HEENT: See HPI  LUNGS: denies shortness of breath or wheezing, See HPI  CARDIOVASCULAR: denies chest pain or palpitations   GI: denies N/V/C or abdominal pain  NEURO: Denies dizziness or weakness    EXAM:   /68   Pulse 88   Temp 98.2 °F (36.8 °C) (Oral)   Resp 18   Ht 5' 3\" (1.6 m)   Wt 130 lb (59 kg)   LMP 2024 (Exact Date)   SpO2 98%   Breastfeeding No   BMI 23.03 kg/m²    GENERAL: well developed, well nourished,in no apparent distress  SKIN: no rashes,no suspicious lesions  HEAD: atraumatic, normocephalic.  no tenderness on palpation of  sinuses  EYES: conjunctiva clear, EOM intact  EARS: TM's gray, no bulging, no retraction,no fluid, bony landmarks visible  NOSE: Nostrils patent, no nasal discharge, nasal mucosa erythematous   THROAT: Oral mucosa pink, moist. Posterior pharynx is non erythematous. no exudates. Tonsils 1/4.    NECK: Supple, non-tender  LUNGS: clear to auscultation bilaterally, no wheezes or rhonchi. Breathing is non labored.  CARDIO: RRR without murmur  EXTREMITIES: no cyanosis, clubbing or edema  LYMPH:  no lymphadenopathy.        ASSESSMENT AND PLAN:   Cande Tamez is a 43 year old female who presents with upper respiratory symptoms that are consistent with    ASSESSMENT:   Encounter Diagnosis   Name Primary?    Acute viral syndrome Yes       PLAN:   Symptoms improving, suspect possible influenza or COVID. Offered viral testing, pt declines as it would not change course of treatment at this point in time.   Comfort care per pt instructions.   To follow up for any new or worsening symptoms or if not improving the next few days.   To go to the ER for any severe symptoms such as difficulty breathing or chest pain.     Meds & Refills for this Visit:  Requested Prescriptions      No prescriptions requested or ordered in this encounter       Risks, benefits, and side effects of medication explained and discussed.    Patient Instructions   Push fluids and rest  Expectorant such as mucinex  Follow up for any new or worsening symptoms or if symptoms are not improving over the next 3-4 days.       The patient indicates understanding of these issues and agrees to the plan.  The patient is asked to return if sx's persist or worsen.

## 2024-06-04 ENCOUNTER — APPOINTMENT (OUTPATIENT)
Dept: URBAN - METROPOLITAN AREA CLINIC 247 | Age: 44
Setting detail: DERMATOLOGY
End: 2024-06-04

## 2024-06-04 DIAGNOSIS — D22 MELANOCYTIC NEVI: ICD-10-CM

## 2024-06-04 DIAGNOSIS — L81.4 OTHER MELANIN HYPERPIGMENTATION: ICD-10-CM

## 2024-06-04 DIAGNOSIS — L82.1 OTHER SEBORRHEIC KERATOSIS: ICD-10-CM

## 2024-06-04 DIAGNOSIS — D18.0 HEMANGIOMA: ICD-10-CM

## 2024-06-04 DIAGNOSIS — D49.2 NEOPLASM OF UNSPECIFIED BEHAVIOR OF BONE, SOFT TISSUE, AND SKIN: ICD-10-CM

## 2024-06-04 DIAGNOSIS — Z71.89 OTHER SPECIFIED COUNSELING: ICD-10-CM

## 2024-06-04 PROBLEM — D22.5 MELANOCYTIC NEVI OF TRUNK: Status: ACTIVE | Noted: 2024-06-04

## 2024-06-04 PROBLEM — D23.72 OTHER BENIGN NEOPLASM OF SKIN OF LEFT LOWER LIMB, INCLUDING HIP: Status: ACTIVE | Noted: 2024-06-04

## 2024-06-04 PROBLEM — D18.01 HEMANGIOMA OF SKIN AND SUBCUTANEOUS TISSUE: Status: ACTIVE | Noted: 2024-06-04

## 2024-06-04 PROCEDURE — 11300 SHAVE SKIN LESION 0.5 CM/<: CPT

## 2024-06-04 PROCEDURE — OTHER COUNSELING: OTHER

## 2024-06-04 PROCEDURE — OTHER MIPS QUALITY: OTHER

## 2024-06-04 PROCEDURE — OTHER SHAVE REMOVAL: OTHER

## 2024-06-04 PROCEDURE — 99203 OFFICE O/P NEW LOW 30 MIN: CPT | Mod: 25

## 2024-06-04 ASSESSMENT — LOCATION DETAILED DESCRIPTION DERM
LOCATION DETAILED: RIGHT INFERIOR LATERAL MIDBACK
LOCATION DETAILED: LEFT MID-UPPER BACK
LOCATION DETAILED: LEFT SUPERIOR MEDIAL UPPER BACK
LOCATION DETAILED: EPIGASTRIC SKIN
LOCATION DETAILED: LEFT SUPERIOR UPPER BACK

## 2024-06-04 ASSESSMENT — LOCATION ZONE DERM: LOCATION ZONE: TRUNK

## 2024-06-04 ASSESSMENT — LOCATION SIMPLE DESCRIPTION DERM
LOCATION SIMPLE: ABDOMEN
LOCATION SIMPLE: RIGHT LOWER BACK
LOCATION SIMPLE: LEFT UPPER BACK

## 2024-06-04 NOTE — PROCEDURE: COUNSELING
Detail Level: Generalized
Sunscreen Recommendations: daily broadspectrum spf 30
Skin Checks Recommendations: monthly self skin exam\\nreviewed ABCDE of melanoma
Detail Level: Detailed
Sunscreen Recommendations: Broadspectrum spf 30 daily and increase to spf 50 with frequent reapplication while outside\\nElta MD clear tint/no tint, reapply with colorscience mineral brush

## 2024-06-04 NOTE — PROCEDURE: MIPS QUALITY
Quality 226: Preventive Care And Screening: Tobacco Use: Screening And Cessation Intervention: Patient screened for tobacco use and is an ex/non-smoker
Quality 431: Preventive Care And Screening: Unhealthy Alcohol Use - Screening: Patient not identified as an unhealthy alcohol user when screened for unhealthy alcohol use using a systematic screening method
Quality 130: Documentation Of Current Medications In The Medical Record: Current Medications Documented
Detail Level: Detailed
Quality 358: Patient-Centered Surgical Risk Assessment And Communication: A patient-specific risk assessment with a risk calculator was not completed or communicated to patient and/or family.
Quality 47: Advance Care Plan: Advance care planning not documented, reason not otherwise specified.

## 2024-06-04 NOTE — PROCEDURE: SHAVE REMOVAL
Medical Necessity Information: It is in your best interest to select a reason for this procedure from the list below. All of these items fulfill various CMS LCD requirements except the new and changing color options.
Medical Necessity Clause: This procedure was medically necessary because the lesion that was treated was:
Lab: -[id number]
Lab Facility: 0
Body Location Override (Optional - Billing Will Still Be Based On Selected Body Map Location If Applicable): left upper back
Detail Level: Detailed
Was A Bandage Applied: Yes
Size Of Lesion In Cm (Required): 0.4
Depth Of Shave: dermis
Biopsy Method: Dermablade
Anesthesia Type: 1% lidocaine with epinephrine
Hemostasis: Drysol
Wound Care: Petrolatum
Path Notes (To The Dermatopathologist): Check margins
Render Path Notes In Note?: No
Consent was obtained from the patient. The risks and benefits to therapy were discussed in detail. Specifically, the risks of infection, scarring, bleeding, prolonged wound healing, incomplete removal, allergy to anesthesia, nerve injury and recurrence were addressed. Prior to the procedure, the treatment site was clearly identified and confirmed by the patient. All components of Universal Protocol/PAUSE Rule completed.
Post-Care Instructions: I reviewed with the patient in detail post-care instructions. Patient is to keep the biopsy site dry overnight, and then apply bacitracin twice daily until healed. Patient may apply hydrogen peroxide soaks to remove any crusting.
Notification Instructions: Patient will be notified of pathology results. However, patient instructed to call the office if not contacted within 2 weeks.
Billing Type: Third-Party Bill

## 2024-06-05 ENCOUNTER — PATIENT MESSAGE (OUTPATIENT)
Dept: FAMILY MEDICINE CLINIC | Facility: CLINIC | Age: 44
End: 2024-06-05

## 2024-06-05 DIAGNOSIS — L65.9 HAIR LOSS: ICD-10-CM

## 2024-06-05 DIAGNOSIS — Z00.00 ROUTINE GENERAL MEDICAL EXAMINATION AT A HEALTH CARE FACILITY: Primary | ICD-10-CM

## 2024-06-05 DIAGNOSIS — R53.83 OTHER FATIGUE: ICD-10-CM

## 2024-06-05 NOTE — TELEPHONE ENCOUNTER
I have pended lab orders for you, is there anything else other than thyroid you want to add for her symptoms    From: Cande Tamez  To: Harry Velez  Sent: 6/5/2024  9:35 AM CDT  Subject: Full fasting blood panel request    I have scheduled my annual physical with Dr. Velez on 6/17, but I would like to get bloodwork done first to go over it during my appointment (especially since I found out he is retiring very soon).  My previous employer would offer a blood draw each year, but I haven't had one done since 2022.  If it's possible to have a blood panel ordered, I will get this done as soon as possible.  Also, I do feel I am having symptoms of possible thyroid/hormonal issues as I am experiencing sudden extreme hair loss, weight gain, very low mood, and constant fatigue.  If the blood panel could include any and all testing in relation to this (TSH and Thyroxine), as well as CBC/CMP, I would be very appreciative.  Thank you!

## 2024-06-05 NOTE — TELEPHONE ENCOUNTER
From: Cande Tamez  To: Harry Velez  Sent: 6/5/2024 9:35 AM CDT  Subject: Full fasting blood panel request    I have scheduled my annual physical with Dr. Velez on 6/17, but I would like to get bloodwork done first to go over it during my appointment (especially since I found out he is retiring very soon). My previous employer would offer a blood draw each year, but I haven't had one done since 2022. If it's possible to have a blood panel ordered, I will get this done as soon as possible. Also, I do feel I am having symptoms of possible thyroid/hormonal issues as I am experiencing sudden extreme hair loss, weight gain, very low mood, and constant fatigue. If the blood panel could include any and all testing in relation to this (TSH and Thyroxine), as well as CBC/CMP, I would be very appreciative. Thank you!

## 2024-06-12 ENCOUNTER — LAB ENCOUNTER (OUTPATIENT)
Dept: LAB | Age: 44
End: 2024-06-12
Attending: FAMILY MEDICINE
Payer: COMMERCIAL

## 2024-06-12 DIAGNOSIS — L65.9 HAIR LOSS: ICD-10-CM

## 2024-06-12 DIAGNOSIS — Z00.00 ROUTINE GENERAL MEDICAL EXAMINATION AT A HEALTH CARE FACILITY: ICD-10-CM

## 2024-06-12 DIAGNOSIS — R53.83 OTHER FATIGUE: ICD-10-CM

## 2024-06-12 LAB
ALBUMIN SERPL-MCNC: 4.3 G/DL (ref 3.4–5)
ALBUMIN/GLOB SERPL: 1.2 {RATIO} (ref 1–2)
ALP LIVER SERPL-CCNC: 56 U/L
ALT SERPL-CCNC: 14 U/L
ANION GAP SERPL CALC-SCNC: 3 MMOL/L (ref 0–18)
AST SERPL-CCNC: 18 U/L (ref 15–37)
BASOPHILS # BLD AUTO: 0.02 X10(3) UL (ref 0–0.2)
BASOPHILS NFR BLD AUTO: 0.5 %
BILIRUB SERPL-MCNC: 1.1 MG/DL (ref 0.1–2)
BUN BLD-MCNC: 10 MG/DL (ref 9–23)
CALCIUM BLD-MCNC: 9.2 MG/DL (ref 8.5–10.1)
CHLORIDE SERPL-SCNC: 109 MMOL/L (ref 98–112)
CHOLEST SERPL-MCNC: 202 MG/DL (ref ?–200)
CO2 SERPL-SCNC: 28 MMOL/L (ref 21–32)
CREAT BLD-MCNC: 0.83 MG/DL
EGFRCR SERPLBLD CKD-EPI 2021: 90 ML/MIN/1.73M2 (ref 60–?)
EOSINOPHIL # BLD AUTO: 0.03 X10(3) UL (ref 0–0.7)
EOSINOPHIL NFR BLD AUTO: 0.7 %
ERYTHROCYTE [DISTWIDTH] IN BLOOD BY AUTOMATED COUNT: 12.9 %
EST. AVERAGE GLUCOSE BLD GHB EST-MCNC: 103 MG/DL (ref 68–126)
FASTING PATIENT LIPID ANSWER: YES
FASTING STATUS PATIENT QL REPORTED: YES
GLOBULIN PLAS-MCNC: 3.6 G/DL (ref 2.8–4.4)
GLUCOSE BLD-MCNC: 96 MG/DL (ref 70–99)
HBA1C MFR BLD: 5.2 % (ref ?–5.7)
HCT VFR BLD AUTO: 40.9 %
HDLC SERPL-MCNC: 76 MG/DL (ref 40–59)
HGB BLD-MCNC: 13.5 G/DL
IMM GRANULOCYTES # BLD AUTO: 0.01 X10(3) UL (ref 0–1)
IMM GRANULOCYTES NFR BLD: 0.2 %
LDLC SERPL CALC-MCNC: 116 MG/DL (ref ?–100)
LYMPHOCYTES # BLD AUTO: 1.16 X10(3) UL (ref 1–4)
LYMPHOCYTES NFR BLD AUTO: 28.4 %
MCH RBC QN AUTO: 30.7 PG (ref 26–34)
MCHC RBC AUTO-ENTMCNC: 33 G/DL (ref 31–37)
MCV RBC AUTO: 93 FL
MONOCYTES # BLD AUTO: 0.36 X10(3) UL (ref 0.1–1)
MONOCYTES NFR BLD AUTO: 8.8 %
NEUTROPHILS # BLD AUTO: 2.51 X10 (3) UL (ref 1.5–7.7)
NEUTROPHILS # BLD AUTO: 2.51 X10(3) UL (ref 1.5–7.7)
NEUTROPHILS NFR BLD AUTO: 61.4 %
NONHDLC SERPL-MCNC: 126 MG/DL (ref ?–130)
OSMOLALITY SERPL CALC.SUM OF ELEC: 289 MOSM/KG (ref 275–295)
PLATELET # BLD AUTO: 275 10(3)UL (ref 150–450)
POTASSIUM SERPL-SCNC: 4.6 MMOL/L (ref 3.5–5.1)
PROT SERPL-MCNC: 7.9 G/DL (ref 6.4–8.2)
RBC # BLD AUTO: 4.4 X10(6)UL
SODIUM SERPL-SCNC: 140 MMOL/L (ref 136–145)
T4 FREE SERPL-MCNC: 0.8 NG/DL (ref 0.8–1.7)
TRIGL SERPL-MCNC: 55 MG/DL (ref 30–149)
TSI SER-ACNC: 3.28 MIU/ML (ref 0.36–3.74)
VLDLC SERPL CALC-MCNC: 9 MG/DL (ref 0–30)
WBC # BLD AUTO: 4.1 X10(3) UL (ref 4–11)

## 2024-06-12 PROCEDURE — 85025 COMPLETE CBC W/AUTO DIFF WBC: CPT | Performed by: FAMILY MEDICINE

## 2024-06-12 PROCEDURE — 84439 ASSAY OF FREE THYROXINE: CPT

## 2024-06-12 PROCEDURE — 83036 HEMOGLOBIN GLYCOSYLATED A1C: CPT

## 2024-06-12 PROCEDURE — 84443 ASSAY THYROID STIM HORMONE: CPT

## 2024-06-12 PROCEDURE — 36415 COLL VENOUS BLD VENIPUNCTURE: CPT

## 2024-06-12 PROCEDURE — 80061 LIPID PANEL: CPT

## 2024-06-12 PROCEDURE — 80053 COMPREHEN METABOLIC PANEL: CPT

## 2024-06-17 ENCOUNTER — OFFICE VISIT (OUTPATIENT)
Dept: FAMILY MEDICINE CLINIC | Facility: CLINIC | Age: 44
End: 2024-06-17
Payer: COMMERCIAL

## 2024-06-17 VITALS
BODY MASS INDEX: 23.92 KG/M2 | RESPIRATION RATE: 16 BRPM | DIASTOLIC BLOOD PRESSURE: 68 MMHG | SYSTOLIC BLOOD PRESSURE: 108 MMHG | HEART RATE: 80 BPM | HEIGHT: 63 IN | OXYGEN SATURATION: 97 % | WEIGHT: 135 LBS

## 2024-06-17 DIAGNOSIS — Z00.00 ROUTINE GENERAL MEDICAL EXAMINATION AT A HEALTH CARE FACILITY: Primary | ICD-10-CM

## 2024-06-17 DIAGNOSIS — Z23 NEED FOR DIPHTHERIA-TETANUS-PERTUSSIS (TDAP) VACCINE: ICD-10-CM

## 2024-06-17 DIAGNOSIS — L65.9 ALOPECIA: ICD-10-CM

## 2024-06-17 PROCEDURE — 99396 PREV VISIT EST AGE 40-64: CPT | Performed by: FAMILY MEDICINE

## 2024-06-17 PROCEDURE — 90715 TDAP VACCINE 7 YRS/> IM: CPT | Performed by: FAMILY MEDICINE

## 2024-06-17 PROCEDURE — 3008F BODY MASS INDEX DOCD: CPT | Performed by: FAMILY MEDICINE

## 2024-06-17 PROCEDURE — 3078F DIAST BP <80 MM HG: CPT | Performed by: FAMILY MEDICINE

## 2024-06-17 PROCEDURE — 3074F SYST BP LT 130 MM HG: CPT | Performed by: FAMILY MEDICINE

## 2024-06-17 PROCEDURE — 90471 IMMUNIZATION ADMIN: CPT | Performed by: FAMILY MEDICINE

## 2024-06-17 NOTE — PROGRESS NOTES
HPI:  Here for a physical.    PAST MEDICAL HISTORY:  Past Medical History:    Abdominal hernia    Abdominal pain    Sometimes severe and usually after eating    Anxiety    Atypical mole    Appears to be dermatitis    Belching    Bleeding nose    Frequent nosebleeds in 2013. Cauterization corrected it    Bloating    Body piercing    Ears only    Cervical herniated disc    Chest pain    Diarrhea, unspecified    Coincides with monthly cycle    Endometriosis    Fatigue    Constantly tired    Flatulence/gas pain/belching    Usually after eating    Food intolerance    Undiagnosed, but definitely seems to be an issue    Frequent urination    Heart palpitations    Few episodes of sudden rapid hearbeat    Heavy menses    History of depression    Undiagnosed and no medication was ever taken or prescribed    History of hysterosalpingogram 2015 Normal    Indigestion    Menses painful    Mouth sores    Cold sores… yes. Stress induced    Shortness of breath    Typically only with strenuous activity    Sleep disturbance    Stress    Ulnar nerve entrapment    Umbilical granuloma    Wears glasses    Weight gain     PAST SURGICAL HISTORY:  Past Surgical History:   Procedure Laterality Date    Colposcopy, cervix w/upper adjacent vagina; w/biopsy(s), cervix  01/2007    LGSIL    Hernia surgery  2013    Needle biopsy left  11/2020    Needle biopsy right  11/2020    Other surgical history      HSG 2015    Sclerotherapy Bilateral 6/20/2014    veins    Umbilical hernia repair  7/29/2013    Procedure: HERNIA UMBILICAL REPAIR ADULT;  Surgeon: SAFIA Apodaca MD;  Location:  MAIN OR     MEDICATIONS:  No current outpatient medications on file.     ALLERGIES: Naproxen and Penicillins    Family History   Problem Relation Age of Onset    Heart Disorder Mother 44        mi    Other (tia) Mother 60    Heart Attack Mother         At age 44    Other (cirrhosis) Father     Alcohol and Other Disorders Associated Father         Cirrhosis of the liver     Cancer Maternal Grandmother         breast    Breast Cancer Maternal Grandmother 58        estimated age    Cancer Maternal Grandfather         lung    Stroke Paternal Grandmother     Breast Cancer Maternal Aunt 55        estimated age    Other (Diabetes, 2) Maternal Aunt     Other (Diabetes, 1) Paternal Great-Grandmother        Social History     Socioeconomic History    Marital status:      Spouse name: Not on file    Number of children: Not on file    Years of education: Not on file    Highest education level: Not on file   Occupational History    Not on file   Tobacco Use    Smoking status: Former     Current packs/day: 0.00     Average packs/day: 0.5 packs/day for 7.0 years (3.5 ttl pk-yrs)     Types: Cigarettes     Start date: 3/27/1995     Quit date: 3/27/2002     Years since quittin.2    Smokeless tobacco: Never    Tobacco comments:     Quit cold turkey   Vaping Use    Vaping status: Never Used   Substance and Sexual Activity    Alcohol use: Yes     Alcohol/week: 1.0 standard drink of alcohol     Types: 1 Standard drinks or equivalent per week     Comment: Very rarely drink    Drug use: Never    Sexual activity: Yes     Partners: Male   Other Topics Concern     Service Not Asked    Blood Transfusions Not Asked    Caffeine Concern Yes     Comment: 1-2 cups a day    Occupational Exposure Not Asked    Hobby Hazards Not Asked    Sleep Concern Not Asked    Stress Concern Not Asked    Weight Concern Not Asked    Special Diet Not Asked    Back Care Not Asked    Exercise Yes     Comment: walking    Bike Helmet Not Asked    Seat Belt Not Asked    Self-Exams Not Asked   Social History Narrative    Not on file     Social Determinants of Health     Financial Resource Strain: Not on file   Food Insecurity: Not on file   Transportation Needs: Not on file   Physical Activity: Not on file   Stress: Not on file   Social Connections: Not on file   Housing Stability: Not on file       REVIEW OF  SYSTEMS:  GENERAL: Feeling generally well.  EYES: No complaints of diplopia or blurred vision.  EARS: No complaints of tinnitus or decreased hearing acuity.  CARDIOVASCULAR: No complaints of chest pain with exertion, no PND, orthopnea, or edema. No decrease in exercise tolerance.  PULMONARY: No complaints of extreme shortness of breath with activity. No complaints of  wheezing. No complaints of hemoptysis.  GASTROINTESTINAL: Not as remarkable as it was previously.  GYNE/GENITOURINARY: No complaints of dysuria, urgency or frequency, no complaints of urine incontinence.  MUSCULOSKELETAL: No complaints of arthralgias or myalgias.  NEURO: No complaints of any new headaches or change in headache pattern.  PSYCHE: No complaints of symptoms of depression or anxiety.  HEMATOLOGY: No complaints of bruising or excessive bleeding.  ENDOCRINE: No complaints of excessive thirst or urination; No complaints of unexpected weight gain or weight loss.  SKIN: Saw dermatology regarding skin check.  She is losing hair in the last month.  Does not recall any major stressors 3 to 4 months ago.  She does recall having an illness.  They elected not to check her for COVID because she was past day 5 of symptoms.      EXAM:  /68   Pulse 80   Resp 16   Ht 5' 3\" (1.6 m)   Wt 135 lb (61.2 kg)   LMP 05/26/2024 (Approximate)   SpO2 97%   BMI 23.91 kg/m²   NAD  GENERAL: well developed, well nourished, in no apparent distress.  EARS: Bilateral pinna / tragus are WNL in appearance, External canals patent and without inflammation. Bilateral tympanic membranes pearly white. No effusions noted. Hearing grossly normal.  EYES: PERRLA, EOMI, bilateral sclera anicteric, non-injected. Conjunctiva pink.  NOSE: Mucosa appears healthy.   OROPHARYNX: Mucosa moist without lesions. Dentition intact and gums appear healthy.  NECK: Supple, No lymphadenopathy. No thyromegaly or lesions palpated.  CARDIOVASCULAR: RRR, NL S1 and S2, no murmurs. Bilateral  carotids without bruit. No abdominal bruits. Bilateral dorsalis pedis and posterior tibial pulses 2+/4+. No edema of the bilateral lower extremities. No JVD noted.  PULMONARY: CTA bilaterally, good air exchange, no rhonchi, wheezes, or rales. No dullness to percussion.  ABDOMEN: Soft, nontender, nondistended, NABS x 4 quadrants. No HSM; no masses; no bruits.  LYMPHATIC: no lymphadenopathy palpated about the anterior and posterior cervical chains, submandibular and supraclavicular areas, antecubital, popliteal, and inguinal areas.  EXTREMITIES / MUSCULOSKELETAL: 4 extremities with grossly normal ROM. Gait NL. No cyanosis, clubbing or edema.  NEUROLOGIC: CN's II-XII grossly intact, DTR's 2+/4+ throughout. Strength 5+/5+ x 4 ext. Alert and orientated.  SKIN: hair pull test negative.  No redness or patches on scalp  PSYCHIATRIC: Mood and affect appropriate.      ASSESSMENT / PLAN:  Routine health maintenence  Pap smear, Clinical Breast exam, and if appropriate Mammogram ordered through pt's GYN.  Labs reviewed  Administer Tetanus, diphtheria, pertussis booster: today.  RHM information discussed: none.  Additional issues: alopecia:  check hormone levels on day 3 of cycle.  [facility].  [facility]    Patient understood above plan and agreed to do labs within the next 30 days as well as to make all appointments for referrals if given within the next 30 days. Patient understands to contact office if unable to do so.

## 2024-07-04 ENCOUNTER — LAB ENCOUNTER (OUTPATIENT)
Dept: LAB | Facility: HOSPITAL | Age: 44
End: 2024-07-04
Attending: FAMILY MEDICINE
Payer: COMMERCIAL

## 2024-07-04 DIAGNOSIS — L65.9 ALOPECIA: ICD-10-CM

## 2024-07-04 LAB
ESTRADIOL SERPL-MCNC: 27.3 PG/ML
FSH SERPL-ACNC: 14 MIU/ML
PROGEST SERPL-MCNC: 0.67 NG/ML

## 2024-07-04 PROCEDURE — 82670 ASSAY OF TOTAL ESTRADIOL: CPT

## 2024-07-04 PROCEDURE — 84144 ASSAY OF PROGESTERONE: CPT

## 2024-07-04 PROCEDURE — 36415 COLL VENOUS BLD VENIPUNCTURE: CPT

## 2024-07-04 PROCEDURE — 83001 ASSAY OF GONADOTROPIN (FSH): CPT

## 2024-10-14 ENCOUNTER — TELEPHONE (OUTPATIENT)
Facility: CLINIC | Age: 44
End: 2024-10-14

## 2024-10-14 DIAGNOSIS — R92.343 EXTREMELY DENSE TISSUE OF BOTH BREASTS ON MAMMOGRAPHY: Primary | ICD-10-CM

## 2024-10-14 DIAGNOSIS — N60.12 FIBROCYSTIC BREAST CHANGES, BILATERAL: ICD-10-CM

## 2024-10-14 DIAGNOSIS — N60.11 FIBROCYSTIC BREAST CHANGES, BILATERAL: ICD-10-CM

## 2024-11-06 ENCOUNTER — HOSPITAL ENCOUNTER (OUTPATIENT)
Dept: MAMMOGRAPHY | Age: 44
Discharge: HOME | End: 2024-11-06
Payer: COMMERCIAL

## 2024-11-06 DIAGNOSIS — N60.11 FIBROCYSTIC BREAST CHANGES, BILATERAL: ICD-10-CM

## 2024-11-06 DIAGNOSIS — N60.12 FIBROCYSTIC BREAST CHANGES, BILATERAL: ICD-10-CM

## 2024-11-06 DIAGNOSIS — R92.343 EXTREMELY DENSE TISSUE OF BOTH BREASTS ON MAMMOGRAPHY: ICD-10-CM

## 2024-11-06 PROCEDURE — 77062 BREAST TOMOSYNTHESIS BI: CPT

## 2024-11-06 PROCEDURE — 77066 DX MAMMO INCL CAD BI: CPT

## 2025-01-14 ENCOUNTER — APPOINTMENT (OUTPATIENT)
Dept: URBAN - METROPOLITAN AREA CLINIC 247 | Age: 45
Setting detail: DERMATOLOGY
End: 2025-01-14

## 2025-01-14 DIAGNOSIS — L81.4 OTHER MELANIN HYPERPIGMENTATION: ICD-10-CM

## 2025-01-14 DIAGNOSIS — D18.0 HEMANGIOMA: ICD-10-CM

## 2025-01-14 DIAGNOSIS — D22 MELANOCYTIC NEVI: ICD-10-CM

## 2025-01-14 DIAGNOSIS — Z71.89 OTHER SPECIFIED COUNSELING: ICD-10-CM

## 2025-01-14 PROBLEM — D23.72 OTHER BENIGN NEOPLASM OF SKIN OF LEFT LOWER LIMB, INCLUDING HIP: Status: ACTIVE | Noted: 2025-01-14

## 2025-01-14 PROBLEM — D22.5 MELANOCYTIC NEVI OF TRUNK: Status: ACTIVE | Noted: 2025-01-14

## 2025-01-14 PROBLEM — D18.01 HEMANGIOMA OF SKIN AND SUBCUTANEOUS TISSUE: Status: ACTIVE | Noted: 2025-01-14

## 2025-01-14 PROCEDURE — OTHER COUNSELING: OTHER

## 2025-01-14 PROCEDURE — 99213 OFFICE O/P EST LOW 20 MIN: CPT

## 2025-01-14 PROCEDURE — OTHER MIPS QUALITY: OTHER

## 2025-01-14 ASSESSMENT — LOCATION DETAILED DESCRIPTION DERM
LOCATION DETAILED: EPIGASTRIC SKIN
LOCATION DETAILED: RIGHT INFERIOR LATERAL MIDBACK
LOCATION DETAILED: LEFT SUPERIOR MEDIAL UPPER BACK

## 2025-01-14 ASSESSMENT — LOCATION SIMPLE DESCRIPTION DERM
LOCATION SIMPLE: ABDOMEN
LOCATION SIMPLE: RIGHT LOWER BACK
LOCATION SIMPLE: LEFT UPPER BACK

## 2025-01-14 ASSESSMENT — LOCATION ZONE DERM: LOCATION ZONE: TRUNK

## 2025-01-14 NOTE — PROCEDURE: COUNSELING
Detail Level: Detailed
Detail Level: Generalized
Sunscreen Recommendations: daily broadspectrum spf 30
Sunscreen Recommendations: Broadspectrum spf 30 daily and increase to spf 50 with frequent reapplication while outside\\nElta MD clear tint/no tint, reapply with colorscience mineral brush
Skin Checks Recommendations: monthly self skin exam\\nreviewed ABCDE of melanoma

## 2025-03-26 ENCOUNTER — OFFICE VISIT (OUTPATIENT)
Facility: CLINIC | Age: 45
End: 2025-03-26
Payer: COMMERCIAL

## 2025-03-26 VITALS — WEIGHT: 135 LBS | BODY MASS INDEX: 24 KG/M2 | DIASTOLIC BLOOD PRESSURE: 76 MMHG | SYSTOLIC BLOOD PRESSURE: 112 MMHG

## 2025-03-26 DIAGNOSIS — N93.9 ABNORMAL UTERINE BLEEDING (AUB): ICD-10-CM

## 2025-03-26 DIAGNOSIS — N92.1 MENORRHAGIA WITH IRREGULAR CYCLE: Primary | ICD-10-CM

## 2025-03-26 DIAGNOSIS — N80.9 ENDOMETRIOSIS: ICD-10-CM

## 2025-03-26 LAB
CONTROL LINE PRESENT WITH A CLEAR BACKGROUND (YES/NO): YES YES/NO
KIT LOT #: NORMAL NUMERIC

## 2025-03-26 PROCEDURE — 99203 OFFICE O/P NEW LOW 30 MIN: CPT | Performed by: OBSTETRICS & GYNECOLOGY

## 2025-03-26 PROCEDURE — 3078F DIAST BP <80 MM HG: CPT | Performed by: OBSTETRICS & GYNECOLOGY

## 2025-03-26 PROCEDURE — 81025 URINE PREGNANCY TEST: CPT | Performed by: OBSTETRICS & GYNECOLOGY

## 2025-03-26 PROCEDURE — 3074F SYST BP LT 130 MM HG: CPT | Performed by: OBSTETRICS & GYNECOLOGY

## 2025-03-26 RX ORDER — PREGABALIN 25 MG/1
25 CAPSULE ORAL 2 TIMES DAILY
COMMUNITY
Start: 2025-03-13 | End: 2026-03-13

## 2025-03-26 NOTE — PROGRESS NOTES
Gynecology Office Visit      Cande Tamez is a 44 year old female  Patient's last menstrual period was 2025 (approximate). (contraception:  none)     HPI:     Chief Complaint   Patient presents with    Vaginal Bleeding     Excessive bleeding and on 13 th day of cycle and an barely make it a hour with a super plus and first time this is happening.       Pt presents with 12 days of heavy menstrual bleeding that started on 3/14. She states that her period was 2 weeks late and is still dealing with the heavy flow today. She has been needing to change her super/ultra plus tampons every 2 hours due to saturation. She only had 1 day of bad cramping which is also in her normal cycle. First time this  is happening with the excessive bleeding. Normally her periods are 3-4 days with days 1-2 being heavy using 4-5 tampons, and getting lighter days 3-4. Normal days between cycle 24-28 days. Has history of OCP use for birth control strictly. But stopped taking it 10 years ago, history of endometriosis.  -Robbie Davidson PAS    Chart and previous encounters reviewed.  HISTORY:  Past Medical History:    Abdominal hernia    Abdominal pain    Sometimes severe and usually after eating    Anxiety    Atypical mole    Appears to be dermatitis    Belching    Bleeding nose    Frequent nosebleeds in 2013. Cauterization corrected it    Bloating    Body piercing    Ears only    Cervical herniated disc    Chest pain    Diarrhea, unspecified    Coincides with monthly cycle    Endometriosis    Fatigue    Constantly tired    Flatulence/gas pain/belching    Usually after eating    Food intolerance    Undiagnosed, but definitely seems to be an issue    Frequent urination    Heart palpitations    Few episodes of sudden rapid hearbeat    Heavy menses    History of depression    Undiagnosed and no medication was ever taken or prescribed    History of hysterosalpingogram  Normal    Indigestion    Menses painful    Mouth  sores    Cold sores… yes. Stress induced    Shortness of breath    Typically only with strenuous activity    Sleep disturbance    Stress    Ulnar nerve entrapment    Umbilical granuloma    Wears glasses    Weight gain      Past Surgical History:   Procedure Laterality Date    Colposcopy, cervix w/upper adjacent vagina; w/biopsy(s), cervix  2007    LGSIL    Hernia surgery  2013    Needle biopsy left  2020    Needle biopsy right  2020    Other surgical history      HSG 2015    Sclerotherapy Bilateral 2014    veins    Umbilical hernia repair  2013    Procedure: HERNIA UMBILICAL REPAIR ADULT;  Surgeon: SAFIA Apodaca MD;  Location:  MAIN OR      Family History   Problem Relation Age of Onset    Heart Disorder Mother 44        mi    Other (tia) Mother 60    Heart Attack Mother         At age 44    Other (cirrhosis) Father     Alcohol and Other Disorders Associated Father         Cirrhosis of the liver    Cancer Maternal Grandmother         breast    Breast Cancer Maternal Grandmother 58        estimated age    Cancer Maternal Grandfather         lung    Stroke Paternal Grandmother     Breast Cancer Maternal Aunt 55        estimated age    Other (Diabetes, 2) Maternal Aunt     Other (Diabetes, 1) Paternal Great-Grandmother       Social History:   Social History     Socioeconomic History    Marital status:    Tobacco Use    Smoking status: Former     Current packs/day: 0.00     Average packs/day: 0.5 packs/day for 7.0 years (3.5 ttl pk-yrs)     Types: Cigarettes     Start date: 3/27/1995     Quit date: 3/27/2002     Years since quittin.0    Smokeless tobacco: Never    Tobacco comments:     Quit cold turkey   Vaping Use    Vaping status: Never Used   Substance and Sexual Activity    Alcohol use: Yes     Alcohol/week: 1.0 standard drink of alcohol     Types: 1 Standard drinks or equivalent per week     Comment: Very rarely drink    Drug use: Never    Sexual activity: Yes     Partners:  Male   Other Topics Concern    Caffeine Concern Yes     Comment: 1-2 cups a day    Exercise Yes     Comment: walking     Social Drivers of Health     Food Insecurity: No Food Insecurity (3/26/2025)    NCSS - Food Insecurity     Worried About Running Out of Food in the Last Year: No     Ran Out of Food in the Last Year: No   Transportation Needs: No Transportation Needs (3/26/2025)    NCSS - Transportation     Lack of Transportation: No   Housing Stability: Not At Risk (3/26/2025)    NCSS - Housing/Utilities     Has Housing: Yes     Worried About Losing Housing: No     Unable to Get Utilities: No        Medications (Active prior to today's visit):  Current Outpatient Medications   Medication Sig Dispense Refill    pregabalin 25 MG Oral Cap Take 1 capsule (25 mg total) by mouth 2 (two) times daily.         Allergies:  Allergies[1]    Gyn:  Menarche: 14  Period Cycle (Days): 24-29  Period Duration (Days): 4  Period Flow: heavy days 2-3  Use of Birth Control (if yes, specify type): None  Hx Prior Abnormal Pap: Yes  Pap Date: 23  Pap Result Notes: 2020 Neg/Neg; 2015 neg/neg; 2013 neg/neg; 2012 neg; 2007 LGSIL  Follow Up Recommendation:     OB Hx:  OB History    Para Term  AB Living   1 0     1     SAB IAB Ectopic Multiple Live Births   1              # Outcome Date GA Lbr Mal/2nd Weight Sex Type Anes PTL Lv   1 SAB 2015 5w0d                ROS:    10 point ROS completed and was negative, except for pertinent positive and negatives stated in the HPI.    PHYSICAL EXAM:   /76   Wt 135 lb (61.2 kg)   LMP 2025 (Approximate)   BMI 23.91 kg/m²      Wt Readings from Last 6 Encounters:   25 135 lb (61.2 kg)   24 135 lb (61.2 kg)   24 130 lb (59 kg)   23 130 lb (59 kg)   23 133 lb (60.3 kg)   22 119 lb 6.4 oz (54.2 kg)        Gen:  Oriented, in no acute distress  Abdomen: scaphoid, benign without peritoneal signs, rebound  tenderness,   guarding, or masses    Pelvic:      External Genitalia: Normal appearing, no lesions.  Vagina: normal pink mucosa, no lesions, menstrual period present.    No anterior or posterior hernias.  Cervix: nulliparous, no lesions , No CMT   Uterus: AVAF, mobile, non tender, normal size  Adnexa: non tender, no masses, normal size  Rectal: deferred       ASSESSMENT/PLAN:     1. Menorrhagia with irregular cycle  - FSH; Future  - LH (Luteinizing Hormone); Future  - TSH W Reflex To Free T4; Future  - CBC, Platelet; No Differential; Future  - Urine Preg Test [20555]    2. Endometriosis    3. Abnormal uterine bleeding (AUB)    Nexstellis - BID until bleeding stops then q day.     Pregnancy test done in office , CBC, FSH, TSH ordered to create baseline. Follow up in one week for ultrasound and endometrial biopsy. Pt instructed to take motrin the night before for pain. Pap smear will be collected in the future to rule out HPV or any malignancy. Pt prescribed Nexstellis 30mcg and instructed to take 1,2, or 3 times daily until bleeding stops then take it once a day for maintenance. Endometrial ablation option discussed. Pt demonstrated understanding and had no further concerns.  -Robbie Davidson [facility] This Visit:  Requested Prescriptions      No prescriptions requested or ordered in this encounter       Imaging & Referrals:  None     Return in about 2 weeks (around 4/9/2025) for ultrasound, Embx.    I have Spent 25 min total time on the day of the encounter, including:        Preparing to see patient  Obtaining and /or reviewing separately obtained history  Performing a medically appropriate examination and evaluation  Counseling and educating the patient  Ordering medications   Preauthorizing procedures   Referring to other providers if indicated  Documenting in the electronic record  Independently interpreting results and communication those results to the patient    Stevan Young MD  3/26/2025  12:57  PM         This note was created by [employer] voice recognition. Errors in content may be related to improper recognition by the system; efforts to review and correct have been done but errors may still exist. Please contact me with any questions.    Note to patient and family   The 21st Century Cures Act makes medical notes available to patients in the interest of transparency.  However, please be advised that this is a medical document.  It is intended as [id number]-te-[id number] communication.  It is written and medical language may contain abbreviations or verbiage that are technical and unfamiliar.  It may appear blunt or direct.  Medical documents are intended to carry relevant information, facts as evident, and the clinical opinion of the practitioner.           [1]   Allergies  Allergen Reactions    Naproxen OTHER (SEE COMMENTS)     nosebleeds    Penicillins HIVES

## 2025-03-28 ENCOUNTER — LAB ENCOUNTER (OUTPATIENT)
Dept: LAB | Age: 45
End: 2025-03-28
Attending: PEDIATRICS
Payer: COMMERCIAL

## 2025-03-28 DIAGNOSIS — N92.1 MENORRHAGIA WITH IRREGULAR CYCLE: ICD-10-CM

## 2025-03-28 LAB
ERYTHROCYTE [DISTWIDTH] IN BLOOD BY AUTOMATED COUNT: 13.5 %
FSH SERPL-ACNC: 9 MIU/ML
HCT VFR BLD AUTO: 42.5 %
HGB BLD-MCNC: 14.1 G/DL
LH SERPL-ACNC: 3.4 MIU/ML
MCH RBC QN AUTO: 31.1 PG (ref 26–34)
MCHC RBC AUTO-ENTMCNC: 33.2 G/DL (ref 31–37)
MCV RBC AUTO: 93.8 FL
PLATELET # BLD AUTO: 326 10(3)UL (ref 150–450)
RBC # BLD AUTO: 4.53 X10(6)UL
TSI SER-ACNC: 1.88 UIU/ML (ref 0.55–4.78)
WBC # BLD AUTO: 7.2 X10(3) UL (ref 4–11)

## 2025-03-28 PROCEDURE — 83002 ASSAY OF GONADOTROPIN (LH): CPT | Performed by: OBSTETRICS & GYNECOLOGY

## 2025-03-28 PROCEDURE — 84443 ASSAY THYROID STIM HORMONE: CPT | Performed by: OBSTETRICS & GYNECOLOGY

## 2025-03-28 PROCEDURE — 83001 ASSAY OF GONADOTROPIN (FSH): CPT | Performed by: OBSTETRICS & GYNECOLOGY

## 2025-03-28 PROCEDURE — 85027 COMPLETE CBC AUTOMATED: CPT | Performed by: OBSTETRICS & GYNECOLOGY

## 2025-04-15 ENCOUNTER — OFFICE VISIT (OUTPATIENT)
Facility: CLINIC | Age: 45
End: 2025-04-15
Payer: COMMERCIAL

## 2025-04-15 VITALS
DIASTOLIC BLOOD PRESSURE: 74 MMHG | HEIGHT: 63 IN | WEIGHT: 135 LBS | SYSTOLIC BLOOD PRESSURE: 116 MMHG | BODY MASS INDEX: 23.92 KG/M2

## 2025-04-15 DIAGNOSIS — N93.9 ABNORMAL UTERINE BLEEDING (AUB): Primary | ICD-10-CM

## 2025-04-15 DIAGNOSIS — Z87.42 HISTORY OF ENDOMETRIOSIS: ICD-10-CM

## 2025-04-15 DIAGNOSIS — N92.0 EXCESSIVE OR FREQUENT MENSTRUATION: ICD-10-CM

## 2025-04-15 DIAGNOSIS — N92.1 MENORRHAGIA WITH IRREGULAR CYCLE: ICD-10-CM

## 2025-04-15 DIAGNOSIS — Z32.00 ENCOUNTER FOR PREGNANCY TEST, RESULT UNKNOWN: ICD-10-CM

## 2025-04-15 LAB
CONTROL LINE PRESENT WITH A CLEAR BACKGROUND (YES/NO): YES YES/NO
KIT LOT #: NORMAL NUMERIC
PREGNANCY TEST, URINE: NEGATIVE

## 2025-04-15 PROCEDURE — 88305 TISSUE EXAM BY PATHOLOGIST: CPT | Performed by: OBSTETRICS & GYNECOLOGY

## 2025-04-15 PROCEDURE — 3074F SYST BP LT 130 MM HG: CPT | Performed by: OBSTETRICS & GYNECOLOGY

## 2025-04-15 PROCEDURE — 3078F DIAST BP <80 MM HG: CPT | Performed by: OBSTETRICS & GYNECOLOGY

## 2025-04-15 PROCEDURE — 58100 BIOPSY OF UTERUS LINING: CPT | Performed by: OBSTETRICS & GYNECOLOGY

## 2025-04-15 PROCEDURE — 88321 CONSLTJ&REPRT SLD PREP ELSWR: CPT | Performed by: OBSTETRICS & GYNECOLOGY

## 2025-04-15 PROCEDURE — 3008F BODY MASS INDEX DOCD: CPT | Performed by: OBSTETRICS & GYNECOLOGY

## 2025-04-15 PROCEDURE — 81025 URINE PREGNANCY TEST: CPT | Performed by: OBSTETRICS & GYNECOLOGY

## 2025-04-15 PROCEDURE — 76830 TRANSVAGINAL US NON-OB: CPT | Performed by: OBSTETRICS & GYNECOLOGY

## 2025-04-15 RX ORDER — DROSPIRENONE AND ESTETROL 3-14.2(28)
1 KIT ORAL DAILY
Qty: 84 TABLET | Refills: 3 | Status: SHIPPED | OUTPATIENT
Start: 2025-04-15 | End: 2025-05-15

## 2025-04-15 NOTE — PROCEDURES
Endometrial Biopsy     Pre-Procedure Care:   Consent was obtained.  Procedure/risks were explained.  Questions were answered.  Correct patient was identified.  Correct side and site were confirmed.    Pregnancy Results: negative from urine test   Birth control method(s) used: none    Indication: Menorrhagia - OCP's stopped bleeding within 24 hours.    Pre-Medications:    The patient was premedicated with Cytotec.    Description of Procedure:   A speculum was placed in the vagina and the cervix was prepped with betadine solution.   Single tooth tenaculum not needed  The uterine cavity was sounded at 7 cm.   The endometrial cavity was curetted for pipelle tissue sampling with 5 passes.  Specimen was sent to pathology.   The single tooth tenaculum was removed.   Silver nitrate was applied at the site of tenaculum application   Good hemostasis was noted.  There were no complications.    There was no blood loss.      Discharge instructions were provided to the patient.    Visit Plan:  Wants to continue on OCP's for now  Ablation and H-IUD discussed in detail    Ultrasound report was reviewed with the patient.    Pelvic ultrasound done for menorrhagia with a known history of endometriosis.  Imaging done transvaginally on April 15, 2025.    Findings: Anteverted anteflexed uterus measuring 9.0 x 4.7 x 3.6 cm with a thin homogeneous 3.2 mm endometrial stripe.  Right ovary measures 2.6 x 1.7 x 2.3 cm and is normal left ovary measures 2.3 x 1.3 x 2.2 cm and is also normal no free fluid in the cul-de-sac.    Impression: Normal pelvic ultrasound       Latest Reference Range & Units 03/28/25 11:32   TSH 0.550 - 4.780 uIU/mL 1.877   FSH No established range for female sex mIU/mL 9.0   LH No established range for female sex mIU/mL 3.4   WBC 4.0 - 11.0 x10(3) uL 7.2   Hemoglobin 12.0 - 16.0 g/dL 14.1   Hematocrit 35.0 - 48.0 % 42.5   Platelet Count 150.0 - 450.0 10(3)uL 326.0   RBC 3.80 - 5.30 x10(6)uL 4.53   MCH 26.0 - 34.0 pg 31.1    MCHC 31.0 - 37.0 g/dL 33.2   MCV 80.0 - 100.0 fL 93.8     Stevan Young MD  4/15/2025  11:14 AM

## 2025-04-16 ENCOUNTER — MED REC SCAN ONLY (OUTPATIENT)
Facility: CLINIC | Age: 45
End: 2025-04-16

## (undated) NOTE — LETTER
Cande Tamez, :1980    CONSENT FOR PROCEDURE/SEDATION    1. I authorize the performance upon Cande Tamez  the following: Endometrial biopsy procedure    2. I authorize Dr. Stevan Young MD (and whomever is designated as the doctor’s assistant), to perform the above-mentioned procedures.    3. If any unforeseen conditions arise during this procedure calling for additional  procedures, operations, or medications (including anesthesia and blood transfusion), I further request and authorize the doctor to do whatever he/she deems advisable in my interest.    4. I consent to the taking and reproduction of any photographs in the course of this procedure for professional purposes.    5. I consent to the administration of such sedation as may be considered necessary or advisable by the physician responsible for this service, with the exception of ______________________________________________________    6. I have been informed by my doctor of the nature and purpose of this procedure sedation, possible alternative methods of treatment, risk involved and possible complications.    7. If I have a Do Not Resuscitate (DNR) order in place, the physician and I (or the individual authorized to consent on my behalf) will discuss and agree as to whether the Do Not Resuscitate (DNR) order will remain in effect or will be discontinued during the performance of the procedure and the applicable recovery period. If the Do Not Resuscitate (DNR) order is discontinued and is to be reinstated following the procedure/recovery period, the physician will determine when the applicable recovery period ends for purposes of reinstating the Do Not Resuscitate (DNR) order.    Signature of Patient:_______________________________________________    Signature of person authorized to consent for patient:  _______________________________________________________________    Relationship to patient:  ____________________________________________    Witness: _________________________________________ Date:___________     Physician Signature: _______________________________ Date:___________

## (undated) NOTE — MR AVS SNAPSHOT
[address]  [address] [id number] [phone]               Thank you for choosing us for your health care visit with Karlos Cyrma.   We are glad to serve you and happy to provide you with this